# Patient Record
Sex: FEMALE | Race: WHITE | NOT HISPANIC OR LATINO | Employment: FULL TIME | URBAN - METROPOLITAN AREA
[De-identification: names, ages, dates, MRNs, and addresses within clinical notes are randomized per-mention and may not be internally consistent; named-entity substitution may affect disease eponyms.]

---

## 2019-02-28 ENCOUNTER — HOSPITAL ENCOUNTER (EMERGENCY)
Facility: HOSPITAL | Age: 24
Discharge: HOME | End: 2019-02-28
Attending: EMERGENCY MEDICINE

## 2019-02-28 VITALS
HEART RATE: 75 BPM | WEIGHT: 135 LBS | SYSTOLIC BLOOD PRESSURE: 109 MMHG | HEIGHT: 63 IN | DIASTOLIC BLOOD PRESSURE: 69 MMHG | RESPIRATION RATE: 18 BRPM | TEMPERATURE: 98 F | OXYGEN SATURATION: 97 % | BODY MASS INDEX: 23.92 KG/M2

## 2019-02-28 DIAGNOSIS — N73.0 PID (ACUTE PELVIC INFLAMMATORY DISEASE): Primary | ICD-10-CM

## 2019-02-28 LAB
ALBUMIN SERPL BCP-MCNC: 4.2 G/DL
ALP SERPL-CCNC: 81 U/L
ALT SERPL W/O P-5'-P-CCNC: 20 U/L
ANION GAP SERPL CALC-SCNC: 11 MMOL/L
AST SERPL-CCNC: 30 U/L
B-HCG UR QL: NEGATIVE
BACTERIA #/AREA URNS HPF: ABNORMAL /HPF
BASOPHILS # BLD AUTO: 0.04 K/UL
BASOPHILS NFR BLD: 0.4 %
BILIRUB DIRECT SERPL-MCNC: <0.1 MG/DL
BILIRUB SERPL-MCNC: 1 MG/DL
BILIRUB UR QL STRIP: NEGATIVE
BUN SERPL-MCNC: 7 MG/DL
CALCIUM SERPL-MCNC: 8.8 MG/DL
CHLORIDE SERPL-SCNC: 98 MMOL/L
CLARITY UR: ABNORMAL
CO2 SERPL-SCNC: 25 MMOL/L
COLOR UR: YELLOW
CREAT SERPL-MCNC: 0.7 MG/DL
DIFFERENTIAL METHOD: ABNORMAL
EOSINOPHIL # BLD AUTO: 0 K/UL
EOSINOPHIL NFR BLD: 0.2 %
ERYTHROCYTE [DISTWIDTH] IN BLOOD BY AUTOMATED COUNT: 11.2 %
EST. GFR  (AFRICAN AMERICAN): >60 ML/MIN/1.73 M^2
EST. GFR  (NON AFRICAN AMERICAN): >60 ML/MIN/1.73 M^2
GLUCOSE SERPL-MCNC: 88 MG/DL
GLUCOSE UR QL STRIP: NEGATIVE
HCT VFR BLD AUTO: 34.3 %
HGB BLD-MCNC: 11.8 G/DL
HGB UR QL STRIP: ABNORMAL
IMM GRANULOCYTES # BLD AUTO: 0.04 K/UL
IMM GRANULOCYTES NFR BLD AUTO: 0.4 %
KETONES UR QL STRIP: ABNORMAL
LEUKOCYTE ESTERASE UR QL STRIP: NEGATIVE
LYMPHOCYTES # BLD AUTO: 1.6 K/UL
LYMPHOCYTES NFR BLD: 17.9 %
MCH RBC QN AUTO: 33.5 PG
MCHC RBC AUTO-ENTMCNC: 34.4 G/DL
MCV RBC AUTO: 97 FL
MICROSCOPIC COMMENT: ABNORMAL
MONOCYTES # BLD AUTO: 1.1 K/UL
MONOCYTES NFR BLD: 11.8 %
NEUTROPHILS # BLD AUTO: 6.4 K/UL
NEUTROPHILS NFR BLD: 69.3 %
NITRITE UR QL STRIP: POSITIVE
NRBC BLD-RTO: 0 /100 WBC
PH UR STRIP: 7 [PH] (ref 5–8)
PLATELET # BLD AUTO: 246 K/UL
PMV BLD AUTO: 10.7 FL
POTASSIUM SERPL-SCNC: 3.6 MMOL/L
PROT SERPL-MCNC: 7.3 G/DL
PROT UR QL STRIP: NEGATIVE
RBC # BLD AUTO: 3.52 M/UL
RBC #/AREA URNS HPF: 5 /HPF (ref 0–4)
SODIUM SERPL-SCNC: 134 MMOL/L
SP GR UR STRIP: >=1.03 (ref 1–1.03)
SQUAMOUS #/AREA URNS HPF: 3 /HPF
URN SPEC COLLECT METH UR: ABNORMAL
UROBILINOGEN UR STRIP-ACNC: 1 EU/DL
WBC # BLD AUTO: 9.18 K/UL
WBC #/AREA URNS HPF: 5 /HPF (ref 0–5)

## 2019-02-28 PROCEDURE — 99284 EMERGENCY DEPT VISIT MOD MDM: CPT | Mod: 25

## 2019-02-28 PROCEDURE — 80076 HEPATIC FUNCTION PANEL: CPT

## 2019-02-28 PROCEDURE — 63600175 PHARM REV CODE 636 W HCPCS: Performed by: FAMILY MEDICINE

## 2019-02-28 PROCEDURE — 25500020 PHARM REV CODE 255: Performed by: EMERGENCY MEDICINE

## 2019-02-28 PROCEDURE — 85025 COMPLETE CBC W/AUTO DIFF WBC: CPT

## 2019-02-28 PROCEDURE — 81000 URINALYSIS NONAUTO W/SCOPE: CPT

## 2019-02-28 PROCEDURE — 74177 CT ABD & PELVIS W/CONTRAST: CPT | Mod: TC

## 2019-02-28 PROCEDURE — 80048 BASIC METABOLIC PNL TOTAL CA: CPT

## 2019-02-28 PROCEDURE — 81025 URINE PREGNANCY TEST: CPT

## 2019-02-28 PROCEDURE — 74177 CT ABDOMEN PELVIS WITH CONTRAST: ICD-10-PCS | Mod: 26,,, | Performed by: RADIOLOGY

## 2019-02-28 PROCEDURE — 25000003 PHARM REV CODE 250: Performed by: FAMILY MEDICINE

## 2019-02-28 PROCEDURE — 74177 CT ABD & PELVIS W/CONTRAST: CPT | Mod: 26,,, | Performed by: RADIOLOGY

## 2019-02-28 PROCEDURE — 96365 THER/PROPH/DIAG IV INF INIT: CPT

## 2019-02-28 RX ORDER — TRAZODONE HYDROCHLORIDE 100 MG/1
100 TABLET ORAL NIGHTLY
COMMUNITY
End: 2021-02-13 | Stop reason: CLARIF

## 2019-02-28 RX ORDER — TRAMADOL HYDROCHLORIDE 50 MG/1
100 TABLET ORAL
Status: COMPLETED | OUTPATIENT
Start: 2019-02-28 | End: 2019-02-28

## 2019-02-28 RX ORDER — TRAMADOL HYDROCHLORIDE 50 MG/1
100 TABLET ORAL EVERY 8 HOURS PRN
Qty: 12 TABLET | Refills: 0 | Status: SHIPPED | OUTPATIENT
Start: 2019-02-28 | End: 2021-02-13 | Stop reason: CLARIF

## 2019-02-28 RX ORDER — DOXYCYCLINE HYCLATE 100 MG
100 TABLET ORAL
Status: COMPLETED | OUTPATIENT
Start: 2019-02-28 | End: 2019-02-28

## 2019-02-28 RX ORDER — IBUPROFEN 400 MG/1
800 TABLET ORAL
Status: COMPLETED | OUTPATIENT
Start: 2019-02-28 | End: 2019-02-28

## 2019-02-28 RX ORDER — DOXYCYCLINE 100 MG/1
100 CAPSULE ORAL 2 TIMES DAILY
Qty: 20 CAPSULE | Refills: 0 | Status: SHIPPED | OUTPATIENT
Start: 2019-02-28 | End: 2019-03-10

## 2019-02-28 RX ADMIN — IOHEXOL 75 ML: 350 INJECTION, SOLUTION INTRAVENOUS at 06:02

## 2019-02-28 RX ADMIN — DOXYCYCLINE HYCLATE 100 MG: 100 TABLET, COATED ORAL at 08:02

## 2019-02-28 RX ADMIN — IBUPROFEN 800 MG: 400 TABLET ORAL at 06:02

## 2019-02-28 RX ADMIN — CEFTRIAXONE 500 MG: 1 INJECTION, SOLUTION INTRAVENOUS at 08:02

## 2019-02-28 RX ADMIN — TRAMADOL HYDROCHLORIDE 100 MG: 50 TABLET, COATED ORAL at 08:02

## 2019-02-28 NOTE — ED PROVIDER NOTES
Encounter Date: 2/28/2019       History     Chief Complaint   Patient presents with    Abdominal Pain     Patient presents with complaints of abdominal pain. Patient started having symptoms 2 days ago.  Pain started sometime after having intercourse with her boyfriend.  Patient describes suprapubic pain that is crampy, constant, nonradiating, with no exacerbating or alleviating factors.  When she woke up today she was having pain in her both upper quadrants.  She denies any nausea or vomiting.  She has about 3 weeks out from her last menstrual cycle.  She denies fever.  She has had cloudy urine but denies any dysuria.  The patient notes pain when she takes a deep breath but she denies any shortness of breath or cough.          Review of patient's allergies indicates:  No Known Allergies  History reviewed. No pertinent past medical history.  History reviewed. No pertinent surgical history.  History reviewed. No pertinent family history.  Social History     Tobacco Use    Smoking status: Never Smoker    Smokeless tobacco: Never Used   Substance Use Topics    Alcohol use: No     Frequency: Never    Drug use: No     Review of Systems   Constitutional: Negative for fever.   HENT: Negative for sore throat.    Respiratory: Negative for chest tightness and shortness of breath.    Cardiovascular: Negative for palpitations.   Gastrointestinal: Positive for abdominal pain. Negative for diarrhea, nausea and vomiting.   Genitourinary: Positive for pelvic pain. Negative for dysuria.   All other systems reviewed and are negative.      Physical Exam     Initial Vitals [02/28/19 1605]   BP Pulse Resp Temp SpO2   102/68 (!) 125 20 -- 96 %      MAP       --         Physical Exam    Nursing note and vitals reviewed.  Constitutional: She appears well-developed and well-nourished. She is not diaphoretic. No distress.   HENT:   Head: Normocephalic and atraumatic.   Right Ear: External ear normal.   Left Ear: External ear normal.    Nose: Nose normal.   Mouth/Throat: Oropharynx is clear and moist.   Eyes: Conjunctivae and EOM are normal. Pupils are equal, round, and reactive to light.   Neck: Normal range of motion. Neck supple.   Cardiovascular: Normal rate, regular rhythm and normal heart sounds.   Pulmonary/Chest: Breath sounds normal.   Abdominal: Soft. She exhibits no mass. There is no guarding.   Patient has mild tenderness in the upper quadrants and significant tenderness in the suprapubic region.  No CVA tenderness noted.   Musculoskeletal: Normal range of motion. She exhibits no edema or tenderness.   Neurological: She is alert and oriented to person, place, and time. No cranial nerve deficit.   Skin: Skin is warm and dry.   Psychiatric: She has a normal mood and affect. Thought content normal.         ED Course   Procedures  Labs Reviewed   URINALYSIS, REFLEX TO URINE CULTURE - Abnormal; Notable for the following components:       Result Value    Appearance, UA Hazy (*)     Specific Gravity, UA >=1.030 (*)     Ketones, UA 1+ (*)     Occult Blood UA 2+ (*)     Nitrite, UA Positive (*)     All other components within normal limits    Narrative:     Preferred Collection Type->Urine, Clean Catch   URINALYSIS MICROSCOPIC - Abnormal; Notable for the following components:    RBC, UA 5 (*)     Bacteria, UA Many (*)     All other components within normal limits    Narrative:     Preferred Collection Type->Urine, Clean Catch   PREGNANCY TEST, URINE RAPID   CBC W/ AUTO DIFFERENTIAL   BASIC METABOLIC PANEL          Imaging Results    None                               Clinical Impression:       ICD-10-CM ICD-9-CM   1. PID (acute pelvic inflammatory disease) N73.0 614.3                                James Angel MD  03/07/19 0575

## 2019-02-28 NOTE — ED TRIAGE NOTES
Patient presents to ER reporting severe abdominal pain after sex. Fist occurring x 2 days ago,then again last hs. Patient feels as though her abd is bloated. She relays that she is having severe pain when she breaths in.

## 2019-03-01 NOTE — ED NOTES
Pt resting comfortably. Reports pain is worse with movement. Visitor at bedside. Updated on plan of care. Waiting on CT report. Denies needs.

## 2019-03-01 NOTE — ED NOTES
No acute changes. Rates pain 5/10. Updated on plan of care and lab add on. Denies needs. Will continue to monitor.

## 2021-02-13 ENCOUNTER — HOSPITAL ENCOUNTER (EMERGENCY)
Facility: HOSPITAL | Age: 26
Discharge: HOME | End: 2021-02-13
Attending: INTERNAL MEDICINE

## 2021-02-13 VITALS
OXYGEN SATURATION: 97 % | WEIGHT: 125 LBS | TEMPERATURE: 99 F | HEART RATE: 87 BPM | SYSTOLIC BLOOD PRESSURE: 119 MMHG | DIASTOLIC BLOOD PRESSURE: 76 MMHG | RESPIRATION RATE: 15 BRPM | BODY MASS INDEX: 22.15 KG/M2 | HEIGHT: 63 IN

## 2021-02-13 DIAGNOSIS — N39.0 URINARY TRACT INFECTION WITH HEMATURIA, SITE UNSPECIFIED: ICD-10-CM

## 2021-02-13 DIAGNOSIS — R10.9 ABDOMINAL PAIN, UNSPECIFIED ABDOMINAL LOCATION: Primary | ICD-10-CM

## 2021-02-13 DIAGNOSIS — R31.9 URINARY TRACT INFECTION WITH HEMATURIA, SITE UNSPECIFIED: ICD-10-CM

## 2021-02-13 LAB
ALBUMIN SERPL BCP-MCNC: 5.3 G/DL (ref 3.5–5.2)
ALP SERPL-CCNC: 105 U/L (ref 55–135)
ALT SERPL W/O P-5'-P-CCNC: 98 U/L (ref 10–44)
AMPHET+METHAMPHET UR QL: NEGATIVE
ANION GAP SERPL CALC-SCNC: 14 MMOL/L (ref 8–16)
AST SERPL-CCNC: 157 U/L (ref 10–40)
B-HCG UR QL: NEGATIVE
BACTERIA #/AREA URNS HPF: ABNORMAL /HPF
BARBITURATES UR QL SCN>200 NG/ML: NEGATIVE
BASOPHILS # BLD AUTO: 0.03 K/UL (ref 0–0.2)
BASOPHILS NFR BLD: 0.5 % (ref 0–1.9)
BENZODIAZ UR QL SCN>200 NG/ML: NEGATIVE
BILIRUB SERPL-MCNC: 2.1 MG/DL (ref 0.1–1)
BILIRUB UR QL STRIP: ABNORMAL
BUN SERPL-MCNC: 7 MG/DL (ref 6–20)
BZE UR QL SCN: NEGATIVE
CALCIUM SERPL-MCNC: 10.1 MG/DL (ref 8.7–10.5)
CANNABINOIDS UR QL SCN: ABNORMAL
CHLORIDE SERPL-SCNC: 96 MMOL/L (ref 95–110)
CLARITY UR: ABNORMAL
CO2 SERPL-SCNC: 26 MMOL/L (ref 23–29)
COLOR UR: ABNORMAL
CREAT SERPL-MCNC: 0.7 MG/DL (ref 0.5–1.4)
CREAT UR-MCNC: >400 MG/DL (ref 15–325)
DIFFERENTIAL METHOD: ABNORMAL
EOSINOPHIL # BLD AUTO: 0 K/UL (ref 0–0.5)
EOSINOPHIL NFR BLD: 0.2 % (ref 0–8)
ERYTHROCYTE [DISTWIDTH] IN BLOOD BY AUTOMATED COUNT: 11.2 % (ref 11.5–14.5)
EST. GFR  (AFRICAN AMERICAN): >60 ML/MIN/1.73 M^2
EST. GFR  (NON AFRICAN AMERICAN): >60 ML/MIN/1.73 M^2
GLUCOSE SERPL-MCNC: 102 MG/DL (ref 70–110)
GLUCOSE UR QL STRIP: NEGATIVE
HCT VFR BLD AUTO: 43.4 % (ref 37–48.5)
HGB BLD-MCNC: 15.2 G/DL (ref 12–16)
HGB UR QL STRIP: ABNORMAL
HYALINE CASTS #/AREA URNS LPF: ABNORMAL /LPF
IMM GRANULOCYTES # BLD AUTO: 0.01 K/UL (ref 0–0.04)
IMM GRANULOCYTES NFR BLD AUTO: 0.2 % (ref 0–0.5)
KETONES UR QL STRIP: ABNORMAL
LEUKOCYTE ESTERASE UR QL STRIP: ABNORMAL
LIPASE SERPL-CCNC: 20 U/L (ref 4–60)
LYMPHOCYTES # BLD AUTO: 1 K/UL (ref 1–4.8)
LYMPHOCYTES NFR BLD: 17.4 % (ref 18–48)
MAGNESIUM SERPL-MCNC: 1.6 MG/DL (ref 1.6–2.6)
MCH RBC QN AUTO: 34.5 PG (ref 27–31)
MCHC RBC AUTO-ENTMCNC: 35 G/DL (ref 32–36)
MCV RBC AUTO: 99 FL (ref 82–98)
MICROSCOPIC COMMENT: ABNORMAL
MONOCYTES # BLD AUTO: 0.6 K/UL (ref 0.3–1)
MONOCYTES NFR BLD: 9.5 % (ref 4–15)
NEUTROPHILS # BLD AUTO: 4.3 K/UL (ref 1.8–7.7)
NEUTROPHILS NFR BLD: 72.2 % (ref 38–73)
NITRITE UR QL STRIP: POSITIVE
NRBC BLD-RTO: 0 /100 WBC
OPIATES UR QL SCN: NEGATIVE
PCP UR QL SCN>25 NG/ML: NEGATIVE
PH UR STRIP: 7 [PH] (ref 5–8)
PLATELET # BLD AUTO: 238 K/UL (ref 150–350)
PMV BLD AUTO: 11.2 FL (ref 9.2–12.9)
POTASSIUM SERPL-SCNC: 4 MMOL/L (ref 3.5–5.1)
PROT SERPL-MCNC: 8.9 G/DL (ref 6–8.4)
PROT UR QL STRIP: ABNORMAL
RBC # BLD AUTO: 4.4 M/UL (ref 4–5.4)
RBC #/AREA URNS HPF: 20 /HPF (ref 0–4)
SODIUM SERPL-SCNC: 136 MMOL/L (ref 136–145)
SP GR UR STRIP: 1.02 (ref 1–1.03)
SQUAMOUS #/AREA URNS HPF: ABNORMAL /HPF
TOXICOLOGY INFORMATION: ABNORMAL
TSH SERPL DL<=0.005 MIU/L-ACNC: 1.57 UIU/ML (ref 0.34–5.6)
URN SPEC COLLECT METH UR: ABNORMAL
UROBILINOGEN UR STRIP-ACNC: ABNORMAL EU/DL
WBC # BLD AUTO: 5.91 K/UL (ref 3.9–12.7)
WBC #/AREA URNS HPF: 20 /HPF (ref 0–5)

## 2021-02-13 PROCEDURE — 87088 URINE BACTERIA CULTURE: CPT

## 2021-02-13 PROCEDURE — 87591 N.GONORRHOEAE DNA AMP PROB: CPT

## 2021-02-13 PROCEDURE — 83690 ASSAY OF LIPASE: CPT

## 2021-02-13 PROCEDURE — 74176 CT ABD & PELVIS W/O CONTRAST: CPT | Mod: 26,,, | Performed by: RADIOLOGY

## 2021-02-13 PROCEDURE — 85025 COMPLETE CBC W/AUTO DIFF WBC: CPT

## 2021-02-13 PROCEDURE — 96361 HYDRATE IV INFUSION ADD-ON: CPT

## 2021-02-13 PROCEDURE — 87186 SC STD MICRODIL/AGAR DIL: CPT

## 2021-02-13 PROCEDURE — 96365 THER/PROPH/DIAG IV INF INIT: CPT

## 2021-02-13 PROCEDURE — 84443 ASSAY THYROID STIM HORMONE: CPT

## 2021-02-13 PROCEDURE — 87077 CULTURE AEROBIC IDENTIFY: CPT

## 2021-02-13 PROCEDURE — 74176 CT ABD & PELVIS W/O CONTRAST: CPT | Mod: TC

## 2021-02-13 PROCEDURE — 87491 CHLMYD TRACH DNA AMP PROBE: CPT

## 2021-02-13 PROCEDURE — 25000003 PHARM REV CODE 250: Performed by: NURSE PRACTITIONER

## 2021-02-13 PROCEDURE — 80307 DRUG TEST PRSMV CHEM ANLYZR: CPT

## 2021-02-13 PROCEDURE — 87086 URINE CULTURE/COLONY COUNT: CPT

## 2021-02-13 PROCEDURE — 63600175 PHARM REV CODE 636 W HCPCS: Performed by: FAMILY MEDICINE

## 2021-02-13 PROCEDURE — 25000003 PHARM REV CODE 250: Performed by: FAMILY MEDICINE

## 2021-02-13 PROCEDURE — 81025 URINE PREGNANCY TEST: CPT

## 2021-02-13 PROCEDURE — 81000 URINALYSIS NONAUTO W/SCOPE: CPT | Mod: 59

## 2021-02-13 PROCEDURE — 83735 ASSAY OF MAGNESIUM: CPT

## 2021-02-13 PROCEDURE — 74176 CT RENAL STONE STUDY ABD PELVIS WO: ICD-10-PCS | Mod: 26,,, | Performed by: RADIOLOGY

## 2021-02-13 PROCEDURE — 80053 COMPREHEN METABOLIC PANEL: CPT

## 2021-02-13 PROCEDURE — 99284 EMERGENCY DEPT VISIT MOD MDM: CPT | Mod: 25

## 2021-02-13 PROCEDURE — 96375 TX/PRO/DX INJ NEW DRUG ADDON: CPT

## 2021-02-13 RX ORDER — ORPHENADRINE CITRATE 100 MG/1
100 TABLET, EXTENDED RELEASE ORAL 2 TIMES DAILY PRN
Qty: 20 TABLET | Refills: 0 | Status: SHIPPED | OUTPATIENT
Start: 2021-02-13 | End: 2021-02-23

## 2021-02-13 RX ORDER — KETOROLAC TROMETHAMINE 10 MG/1
10 TABLET, FILM COATED ORAL EVERY 6 HOURS PRN
Qty: 30 TABLET | Refills: 0 | Status: SHIPPED | OUTPATIENT
Start: 2021-02-13 | End: 2021-07-11 | Stop reason: CLARIF

## 2021-02-13 RX ORDER — KETOROLAC TROMETHAMINE 30 MG/ML
30 INJECTION, SOLUTION INTRAMUSCULAR; INTRAVENOUS
Status: COMPLETED | OUTPATIENT
Start: 2021-02-13 | End: 2021-02-13

## 2021-02-13 RX ORDER — NITROFURANTOIN 25; 75 MG/1; MG/1
100 CAPSULE ORAL 2 TIMES DAILY
Qty: 14 CAPSULE | Refills: 0 | Status: SHIPPED | OUTPATIENT
Start: 2021-02-13 | End: 2021-02-20

## 2021-02-13 RX ADMIN — SODIUM CHLORIDE 1000 ML: 0.9 INJECTION, SOLUTION INTRAVENOUS at 06:02

## 2021-02-13 RX ADMIN — CEFTRIAXONE SODIUM 2 G: 2 INJECTION, POWDER, FOR SOLUTION INTRAMUSCULAR; INTRAVENOUS at 07:02

## 2021-02-13 RX ADMIN — KETOROLAC TROMETHAMINE 30 MG: 30 INJECTION, SOLUTION INTRAMUSCULAR; INTRAVENOUS at 06:02

## 2021-02-15 LAB
C TRACH DNA SPEC QL NAA+PROBE: NOT DETECTED
N GONORRHOEA DNA SPEC QL NAA+PROBE: NOT DETECTED

## 2021-02-16 LAB — BACTERIA UR CULT: ABNORMAL

## 2021-07-11 ENCOUNTER — HOSPITAL ENCOUNTER (EMERGENCY)
Facility: HOSPITAL | Age: 26
Discharge: HOME | End: 2021-07-12
Attending: FAMILY MEDICINE

## 2021-07-11 VITALS
BODY MASS INDEX: 21.26 KG/M2 | DIASTOLIC BLOOD PRESSURE: 94 MMHG | TEMPERATURE: 98 F | SYSTOLIC BLOOD PRESSURE: 126 MMHG | HEIGHT: 63 IN | HEART RATE: 92 BPM | OXYGEN SATURATION: 97 % | WEIGHT: 120 LBS | RESPIRATION RATE: 20 BRPM

## 2021-07-11 DIAGNOSIS — T14.90XA TRAUMA: ICD-10-CM

## 2021-07-11 DIAGNOSIS — S60.222A CONTUSION OF LEFT HAND, INITIAL ENCOUNTER: Primary | ICD-10-CM

## 2021-07-11 DIAGNOSIS — R52 PAIN: ICD-10-CM

## 2021-07-11 PROCEDURE — 73110 X-RAY EXAM OF WRIST: CPT | Mod: 26,LT,, | Performed by: RADIOLOGY

## 2021-07-11 PROCEDURE — 99284 EMERGENCY DEPT VISIT MOD MDM: CPT | Mod: 25

## 2021-07-11 PROCEDURE — 73120 X-RAY EXAM OF HAND: CPT | Mod: TC,FY,LT

## 2021-07-11 PROCEDURE — 73110 X-RAY EXAM OF WRIST: CPT | Mod: TC,FY,LT

## 2021-07-11 PROCEDURE — 73120 XR HAND 2 VIEW LEFT: ICD-10-PCS | Mod: 26,LT,, | Performed by: RADIOLOGY

## 2021-07-11 PROCEDURE — 73110 XR WRIST COMPLETE 3 VIEWS LEFT: ICD-10-PCS | Mod: 26,LT,, | Performed by: RADIOLOGY

## 2021-07-11 PROCEDURE — 73120 X-RAY EXAM OF HAND: CPT | Mod: 26,LT,, | Performed by: RADIOLOGY

## 2022-01-17 ENCOUNTER — HOSPITAL ENCOUNTER (EMERGENCY)
Facility: HOSPITAL | Age: 27
Discharge: HOME | End: 2022-01-17
Attending: INTERNAL MEDICINE

## 2022-01-17 VITALS
SYSTOLIC BLOOD PRESSURE: 102 MMHG | HEIGHT: 63 IN | TEMPERATURE: 99 F | BODY MASS INDEX: 21.26 KG/M2 | RESPIRATION RATE: 20 BRPM | WEIGHT: 120 LBS | DIASTOLIC BLOOD PRESSURE: 56 MMHG | OXYGEN SATURATION: 99 % | HEART RATE: 110 BPM

## 2022-01-17 DIAGNOSIS — N20.0 KIDNEY STONE: Primary | ICD-10-CM

## 2022-01-17 LAB
ALBUMIN SERPL BCP-MCNC: 4.5 G/DL (ref 3.5–5.2)
ALP SERPL-CCNC: 126 U/L (ref 55–135)
ALT SERPL W/O P-5'-P-CCNC: 105 U/L (ref 10–44)
ANION GAP SERPL CALC-SCNC: 19 MMOL/L (ref 8–16)
AST SERPL-CCNC: 168 U/L (ref 10–40)
B-HCG UR QL: NEGATIVE
BACTERIA #/AREA URNS HPF: ABNORMAL /HPF
BASOPHILS # BLD AUTO: 0.05 K/UL (ref 0–0.2)
BASOPHILS NFR BLD: 1 % (ref 0–1.9)
BILIRUB SERPL-MCNC: 1.2 MG/DL (ref 0.1–1)
BILIRUB UR QL STRIP: ABNORMAL
BUN SERPL-MCNC: 6 MG/DL (ref 6–20)
CALCIUM SERPL-MCNC: 9.8 MG/DL (ref 8.7–10.5)
CHLORIDE SERPL-SCNC: 94 MMOL/L (ref 95–110)
CLARITY UR: ABNORMAL
CO2 SERPL-SCNC: 21 MMOL/L (ref 23–29)
COLOR UR: YELLOW
CREAT SERPL-MCNC: 0.8 MG/DL (ref 0.5–1.4)
DIFFERENTIAL METHOD: ABNORMAL
EOSINOPHIL # BLD AUTO: 0 K/UL (ref 0–0.5)
EOSINOPHIL NFR BLD: 0.4 % (ref 0–8)
ERYTHROCYTE [DISTWIDTH] IN BLOOD BY AUTOMATED COUNT: 11.4 % (ref 11.5–14.5)
EST. GFR  (AFRICAN AMERICAN): >60 ML/MIN/1.73 M^2
EST. GFR  (NON AFRICAN AMERICAN): >60 ML/MIN/1.73 M^2
GLUCOSE SERPL-MCNC: 91 MG/DL (ref 70–110)
GLUCOSE UR QL STRIP: ABNORMAL
HCT VFR BLD AUTO: 41.5 % (ref 37–48.5)
HGB BLD-MCNC: 14.8 G/DL (ref 12–16)
HGB UR QL STRIP: ABNORMAL
HYALINE CASTS #/AREA URNS LPF: 0 /LPF
IMM GRANULOCYTES # BLD AUTO: 0.01 K/UL (ref 0–0.04)
IMM GRANULOCYTES NFR BLD AUTO: 0.2 % (ref 0–0.5)
KETONES UR QL STRIP: ABNORMAL
LEUKOCYTE ESTERASE UR QL STRIP: NEGATIVE
LYMPHOCYTES # BLD AUTO: 0.2 K/UL (ref 1–4.8)
LYMPHOCYTES NFR BLD: 4.6 % (ref 18–48)
MCH RBC QN AUTO: 35.2 PG (ref 27–31)
MCHC RBC AUTO-ENTMCNC: 35.7 G/DL (ref 32–36)
MCV RBC AUTO: 99 FL (ref 82–98)
MICROSCOPIC COMMENT: ABNORMAL
MONOCYTES # BLD AUTO: 0.7 K/UL (ref 0.3–1)
MONOCYTES NFR BLD: 13.1 % (ref 4–15)
NEUTROPHILS # BLD AUTO: 4.2 K/UL (ref 1.8–7.7)
NEUTROPHILS NFR BLD: 80.7 % (ref 38–73)
NITRITE UR QL STRIP: NEGATIVE
NRBC BLD-RTO: 0 /100 WBC
PH UR STRIP: 6 [PH] (ref 5–8)
PLATELET # BLD AUTO: 197 K/UL (ref 150–450)
PMV BLD AUTO: 10.6 FL (ref 9.2–12.9)
POTASSIUM SERPL-SCNC: 3.8 MMOL/L (ref 3.5–5.1)
PROT SERPL-MCNC: 7.9 G/DL (ref 6–8.4)
PROT UR QL STRIP: ABNORMAL
RBC # BLD AUTO: 4.2 M/UL (ref 4–5.4)
RBC #/AREA URNS HPF: 2 /HPF (ref 0–4)
SODIUM SERPL-SCNC: 134 MMOL/L (ref 136–145)
SP GR UR STRIP: >=1.03 (ref 1–1.03)
URN SPEC COLLECT METH UR: ABNORMAL
UROBILINOGEN UR STRIP-ACNC: 1 EU/DL
WBC # BLD AUTO: 5.2 K/UL (ref 3.9–12.7)
WBC #/AREA URNS HPF: 3 /HPF (ref 0–5)

## 2022-01-17 PROCEDURE — 96361 HYDRATE IV INFUSION ADD-ON: CPT

## 2022-01-17 PROCEDURE — 74176 CT ABD & PELVIS W/O CONTRAST: CPT | Mod: TC

## 2022-01-17 PROCEDURE — 99284 EMERGENCY DEPT VISIT MOD MDM: CPT | Mod: 25

## 2022-01-17 PROCEDURE — U0003 INFECTIOUS AGENT DETECTION BY NUCLEIC ACID (DNA OR RNA); SEVERE ACUTE RESPIRATORY SYNDROME CORONAVIRUS 2 (SARS-COV-2) (CORONAVIRUS DISEASE [COVID-19]), AMPLIFIED PROBE TECHNIQUE, MAKING USE OF HIGH THROUGHPUT TECHNOLOGIES AS DESCRIBED BY CMS-2020-01-R: HCPCS | Performed by: NURSE PRACTITIONER

## 2022-01-17 PROCEDURE — 80053 COMPREHEN METABOLIC PANEL: CPT | Performed by: NURSE PRACTITIONER

## 2022-01-17 PROCEDURE — 25000003 PHARM REV CODE 250: Performed by: NURSE PRACTITIONER

## 2022-01-17 PROCEDURE — 63600175 PHARM REV CODE 636 W HCPCS

## 2022-01-17 PROCEDURE — 96365 THER/PROPH/DIAG IV INF INIT: CPT

## 2022-01-17 PROCEDURE — 96375 TX/PRO/DX INJ NEW DRUG ADDON: CPT

## 2022-01-17 PROCEDURE — 81025 URINE PREGNANCY TEST: CPT | Performed by: EMERGENCY MEDICINE

## 2022-01-17 PROCEDURE — 63600175 PHARM REV CODE 636 W HCPCS: Performed by: NURSE PRACTITIONER

## 2022-01-17 PROCEDURE — 74176 CT ABD & PELVIS W/O CONTRAST: CPT | Mod: 26,,, | Performed by: RADIOLOGY

## 2022-01-17 PROCEDURE — U0005 INFEC AGEN DETEC AMPLI PROBE: HCPCS | Performed by: NURSE PRACTITIONER

## 2022-01-17 PROCEDURE — 85025 COMPLETE CBC W/AUTO DIFF WBC: CPT | Performed by: NURSE PRACTITIONER

## 2022-01-17 PROCEDURE — 81000 URINALYSIS NONAUTO W/SCOPE: CPT | Performed by: NURSE PRACTITIONER

## 2022-01-17 PROCEDURE — 74176 CT RENAL STONE STUDY ABD PELVIS WO: ICD-10-PCS | Mod: 26,,, | Performed by: RADIOLOGY

## 2022-01-17 RX ORDER — ACETAMINOPHEN 325 MG/1
650 TABLET ORAL
Status: COMPLETED | OUTPATIENT
Start: 2022-01-17 | End: 2022-01-17

## 2022-01-17 RX ORDER — SODIUM CHLORIDE 9 MG/ML
INJECTION, SOLUTION INTRAVENOUS
Status: COMPLETED | OUTPATIENT
Start: 2022-01-17 | End: 2022-01-17

## 2022-01-17 RX ORDER — TRAMADOL HYDROCHLORIDE 50 MG/1
50 TABLET ORAL EVERY 6 HOURS PRN
Qty: 6 TABLET | Refills: 0 | OUTPATIENT
Start: 2022-01-17 | End: 2023-04-13

## 2022-01-17 RX ORDER — ONDANSETRON 4 MG/1
4 TABLET, FILM COATED ORAL EVERY 6 HOURS
Qty: 12 TABLET | Refills: 0 | Status: SHIPPED | OUTPATIENT
Start: 2022-01-17 | End: 2023-10-22 | Stop reason: CLARIF

## 2022-01-17 RX ORDER — TAMSULOSIN HYDROCHLORIDE 0.4 MG/1
0.4 CAPSULE ORAL DAILY
Qty: 6 CAPSULE | Refills: 0 | Status: SHIPPED | OUTPATIENT
Start: 2022-01-17 | End: 2022-01-23

## 2022-01-17 RX ORDER — KETOROLAC TROMETHAMINE 30 MG/ML
15 INJECTION, SOLUTION INTRAMUSCULAR; INTRAVENOUS
Status: COMPLETED | OUTPATIENT
Start: 2022-01-17 | End: 2022-01-17

## 2022-01-17 RX ORDER — KETOROLAC TROMETHAMINE 30 MG/ML
INJECTION, SOLUTION INTRAMUSCULAR; INTRAVENOUS
Status: COMPLETED
Start: 2022-01-17 | End: 2022-01-17

## 2022-01-17 RX ORDER — ONDANSETRON 2 MG/ML
4 INJECTION INTRAMUSCULAR; INTRAVENOUS
Status: COMPLETED | OUTPATIENT
Start: 2022-01-17 | End: 2022-01-17

## 2022-01-17 RX ADMIN — SODIUM CHLORIDE 1000 ML/HR: 0.9 INJECTION, SOLUTION INTRAVENOUS at 08:01

## 2022-01-17 RX ADMIN — ACETAMINOPHEN 650 MG: 325 TABLET ORAL at 08:01

## 2022-01-17 RX ADMIN — CEFTRIAXONE 1 G: 1 INJECTION, SOLUTION INTRAVENOUS at 09:01

## 2022-01-17 RX ADMIN — ONDANSETRON 4 MG: 2 INJECTION INTRAMUSCULAR; INTRAVENOUS at 08:01

## 2022-01-17 RX ADMIN — KETOROLAC TROMETHAMINE 15 MG: 30 INJECTION, SOLUTION INTRAMUSCULAR; INTRAVENOUS at 09:01

## 2022-01-17 RX ADMIN — KETOROLAC TROMETHAMINE 15 MG: 30 INJECTION, SOLUTION INTRAMUSCULAR at 09:01

## 2022-01-17 NOTE — Clinical Note
"Sarah"Donna Montalvo was seen and treated in our emergency department on 1/17/2022.     COVID-19 is present in our communities across the state. There is limited testing for COVID at this time, so not all patients can be tested. In this situation, your employee meets the following criteria:    Sarah Montalvo has met the criteria for COVID-19 testing and has a POSITIVE result. She can return to work once they are asymptomatic for 24 hours without the use of fever reducing medications AND at least five days from the first positive result. A mask is recommended for 5 days post quarantine.     If you have any questions or concerns, or if I can be of further assistance, please do not hesitate to contact me.    Sincerely,             SALO Connelly"

## 2022-01-18 LAB
SARS-COV-2 RNA RESP QL NAA+PROBE: DETECTED
SARS-COV-2- CYCLE NUMBER: 24

## 2022-01-18 NOTE — ED PROVIDER NOTES
Encounter Date: 1/17/2022       History     Chief Complaint   Patient presents with    Nausea    COVID-19 Concerns    Generalized Body Aches    Dysuria     Patient presents complaining of generalized body aches nausea as well as concern for urinary tract infection with mild dysuria states she is concerned she has COVID.        Review of patient's allergies indicates:  No Known Allergies  History reviewed. No pertinent past medical history.  History reviewed. No pertinent surgical history.  History reviewed. No pertinent family history.  Social History     Tobacco Use    Smoking status: Never Smoker    Smokeless tobacco: Never Used   Substance Use Topics    Alcohol use: No    Drug use: No     Review of Systems   Constitutional: Negative.    HENT: Positive for congestion.    Eyes: Negative.    Respiratory: Positive for cough.    Gastrointestinal: Positive for nausea.   Genitourinary: Positive for decreased urine volume and urgency. Negative for flank pain.   Neurological: Negative.    Psychiatric/Behavioral: Negative.        Physical Exam     Initial Vitals   BP Pulse Resp Temp SpO2   01/17/22 1841 01/17/22 1838 01/17/22 1838 01/17/22 1838 01/17/22 1841   104/81 110 20 98.7 °F (37.1 °C) 99 %      MAP       --                Physical Exam    Vitals reviewed.  Constitutional: She appears well-developed.   HENT:   Head: Normocephalic.   Eyes: Pupils are equal, round, and reactive to light.   Neck:   Normal range of motion.  Cardiovascular: Normal rate and regular rhythm.   Pulmonary/Chest: Breath sounds normal. She has no wheezes. She has no rhonchi. She has no rales.   Musculoskeletal:         General: Normal range of motion.      Cervical back: Normal range of motion.     Neurological: She is alert and oriented to person, place, and time. GCS score is 15. GCS eye subscore is 4. GCS verbal subscore is 5. GCS motor subscore is 6.   Skin: Skin is warm. Capillary refill takes less than 2 seconds.         ED Course    Procedures  Labs Reviewed   SARS-COV-2 (COVID-19) QUALITATIVE PCR - Abnormal; Notable for the following components:       Result Value    SARS-CoV2 (COVID-19) Qualitative PCR Detected (*)     All other components within normal limits    Narrative:     Is the patient symptomatic?->Yes  Is testing needed for patient travel?->No  Is this needed for pre-procedure or pre-op testing?->No   URINALYSIS, REFLEX TO URINE CULTURE - Abnormal; Notable for the following components:    Appearance, UA Hazy (*)     Specific Gravity, UA >=1.030 (*)     Protein, UA 2+ (*)     Glucose, UA Trace (*)     Ketones, UA 3+ (*)     Bilirubin (UA) 3+ (*)     Occult Blood UA 2+ (*)     All other components within normal limits    Narrative:     Preferred Collection Type->Urine, Clean Catch  Specimen Source->Urine   URINALYSIS MICROSCOPIC - Abnormal; Notable for the following components:    Bacteria Few (*)     All other components within normal limits    Narrative:     Preferred Collection Type->Urine, Clean Catch  Specimen Source->Urine   CBC W/ AUTO DIFFERENTIAL - Abnormal; Notable for the following components:    MCV 99 (*)     MCH 35.2 (*)     RDW 11.4 (*)     Lymph # 0.2 (*)     Gran % 80.7 (*)     Lymph % 4.6 (*)     All other components within normal limits   COMPREHENSIVE METABOLIC PANEL - Abnormal; Notable for the following components:    Sodium 134 (*)     Chloride 94 (*)     CO2 21 (*)     Total Bilirubin 1.2 (*)      (*)      (*)     Anion Gap 19 (*)     All other components within normal limits   PREGNANCY TEST, URINE RAPID    Narrative:     Specimen Source->Urine   SARS-COV-2- CYCLE NUMBER    Narrative:     Is the patient symptomatic?->Yes  Is testing needed for patient travel?->No  Is this needed for pre-procedure or pre-op testing?->No          Imaging Results          CT Renal Stone Study ABD Pelvis WO (Final result)  Result time 01/18/22 08:02:56    Final result by Debbie Atkinson MD (01/18/22 08:02:56)                  Impression:      8 mm nonobstructing left renal stone.  No hydronephrosis, opaque ureteral stone, ureteral obstruction or other acute findings evident    Final read      Electronically signed by: Debbie Atkinson MD  Date:    01/18/2022  Time:    08:02             Narrative:    EXAMINATION:  CT RENAL STONE STUDY ABD PELVIS WO    CLINICAL HISTORY:  Flank pain, kidney stone suspected;    TECHNIQUE:  Low dose axial images, sagittal and coronal reformations were obtained from the lung bases to the pubic symphysis.  Contrast was not administered.    COMPARISON:  02/13/2021    FINDINGS:  Severe diffuse fatty infiltration of the liver.  No calcified stones the gallbladder or CT findings of acute cholecystitis.  No biliary duct dilatation.  Spleen not enlarged.  Adrenal glands unremarkable appearance.  Pancreas unremarkable appearance.  Abdominal aorta no aneurysm    Stomach, bowel, mesentery; normal appearance of the appendix.  No appreciable bowel wall thickening or inflammatory change.  No free intraperitoneal air or fluid.    Kidneys, ureters, bladder;    Unremarkable appearance of the right kidney.  No hydronephrosis, opaque renal or ureteral stone or ureteral obstruction    Left kidney; 8 mm lower pole stone.  No hydronephrosis.  No ureteral dilatation or opaque ureteral stone or ureteral obstruction    Urinary bladder decompressed at the time of the exam.    Reproductive organs; unremarkable appearance for the patient's age.  Bilateral ovarian cysts.  Typically better evaluate by ultrasound which could be obtained if indicated clinically.    Osseous structures mild degenerative changes no obvious aggressive appearing osseous lesion                                 Medications   acetaminophen tablet 650 mg (650 mg Oral Given 1/17/22 2051)   ondansetron injection 4 mg (4 mg Intravenous Given 1/17/22 2051)   0.9%  NaCl infusion ( Intravenous Stopped 1/17/22 2240)   cefTRIAXone (ROCEPHIN) 1 g/50 mL D5W IVPB (0 g  Intravenous Stopped 22)   ketorolac injection 15 mg (15 mg Intravenous Given 22)     Medical Decision Making:   ED Management:  Patient tolerating fluids afebrile relates improvement after pain medication.  Awaiting CT results                      Clinical Impression:   Final diagnoses:  [N20.0] Kidney stone (Primary)          ED Disposition Condition    Discharge Stable        ED Prescriptions     Medication Sig Dispense Start Date End Date Auth. Provider    ondansetron (ZOFRAN) 4 MG tablet Take 1 tablet (4 mg total) by mouth every 6 (six) hours. 12 tablet 2022  Bandar Dias NP    traMADoL (ULTRAM) 50 mg tablet Take 1 tablet (50 mg total) by mouth every 6 (six) hours as needed for Pain. 6 tablet 2022  Bandar Dias NP    tamsulosin (FLOMAX) 0.4 mg Cap () Take 1 capsule (0.4 mg total) by mouth once daily. for 6 doses 6 capsule 2022 Bandar Dias NP        Follow-up Information    None          Joaquin Garcia MD  22

## 2023-04-13 ENCOUNTER — HOSPITAL ENCOUNTER (EMERGENCY)
Facility: HOSPITAL | Age: 28
Discharge: HOME | End: 2023-04-13
Attending: EMERGENCY MEDICINE
Payer: MEDICAID

## 2023-04-13 VITALS
TEMPERATURE: 98 F | HEART RATE: 102 BPM | RESPIRATION RATE: 18 BRPM | SYSTOLIC BLOOD PRESSURE: 117 MMHG | DIASTOLIC BLOOD PRESSURE: 79 MMHG | HEIGHT: 63 IN | BODY MASS INDEX: 22.15 KG/M2 | OXYGEN SATURATION: 98 % | WEIGHT: 125 LBS

## 2023-04-13 DIAGNOSIS — N39.0 URINARY TRACT INFECTION WITHOUT HEMATURIA, SITE UNSPECIFIED: Primary | ICD-10-CM

## 2023-04-13 LAB
ALBUMIN SERPL BCP-MCNC: 4.5 G/DL (ref 3.5–5.2)
ALP SERPL-CCNC: 111 U/L (ref 55–135)
ALT SERPL W/O P-5'-P-CCNC: 33 U/L (ref 10–44)
ANION GAP SERPL CALC-SCNC: 16 MMOL/L (ref 8–16)
AST SERPL-CCNC: 43 U/L (ref 10–40)
B-HCG UR QL: NEGATIVE
BACTERIA #/AREA URNS HPF: ABNORMAL /HPF
BASOPHILS # BLD AUTO: 0.07 K/UL (ref 0–0.2)
BASOPHILS NFR BLD: 0.6 % (ref 0–1.9)
BILIRUB SERPL-MCNC: 0.4 MG/DL (ref 0.1–1)
BILIRUB UR QL STRIP: NEGATIVE
BUN SERPL-MCNC: 8 MG/DL (ref 6–20)
CALCIUM SERPL-MCNC: 9.3 MG/DL (ref 8.7–10.5)
CHLORIDE SERPL-SCNC: 102 MMOL/L (ref 95–110)
CLARITY UR: ABNORMAL
CO2 SERPL-SCNC: 22 MMOL/L (ref 23–29)
COLOR UR: YELLOW
CREAT SERPL-MCNC: 0.8 MG/DL (ref 0.5–1.4)
DIFFERENTIAL METHOD: ABNORMAL
EOSINOPHIL # BLD AUTO: 0.1 K/UL (ref 0–0.5)
EOSINOPHIL NFR BLD: 0.8 % (ref 0–8)
ERYTHROCYTE [DISTWIDTH] IN BLOOD BY AUTOMATED COUNT: 12.8 % (ref 11.5–14.5)
EST. GFR  (NO RACE VARIABLE): >60 ML/MIN/1.73 M^2
GLUCOSE SERPL-MCNC: 83 MG/DL (ref 70–110)
GLUCOSE UR QL STRIP: NEGATIVE
HCT VFR BLD AUTO: 41.6 % (ref 37–48.5)
HGB BLD-MCNC: 14.5 G/DL (ref 12–16)
HGB UR QL STRIP: ABNORMAL
HYALINE CASTS #/AREA URNS LPF: 0 /LPF
IMM GRANULOCYTES # BLD AUTO: 0.04 K/UL (ref 0–0.04)
IMM GRANULOCYTES NFR BLD AUTO: 0.4 % (ref 0–0.5)
KETONES UR QL STRIP: NEGATIVE
LEUKOCYTE ESTERASE UR QL STRIP: ABNORMAL
LYMPHOCYTES # BLD AUTO: 2.7 K/UL (ref 1–4.8)
LYMPHOCYTES NFR BLD: 24.3 % (ref 18–48)
MCH RBC QN AUTO: 33.3 PG (ref 27–31)
MCHC RBC AUTO-ENTMCNC: 34.9 G/DL (ref 32–36)
MCV RBC AUTO: 96 FL (ref 82–98)
MICROSCOPIC COMMENT: ABNORMAL
MONOCYTES # BLD AUTO: 0.8 K/UL (ref 0.3–1)
MONOCYTES NFR BLD: 7.4 % (ref 4–15)
NEUTROPHILS # BLD AUTO: 7.4 K/UL (ref 1.8–7.7)
NEUTROPHILS NFR BLD: 66.5 % (ref 38–73)
NITRITE UR QL STRIP: NEGATIVE
NRBC BLD-RTO: 0 /100 WBC
PH UR STRIP: 6 [PH] (ref 5–8)
PLATELET # BLD AUTO: 288 K/UL (ref 150–450)
PMV BLD AUTO: 10.2 FL (ref 9.2–12.9)
POTASSIUM SERPL-SCNC: 3.9 MMOL/L (ref 3.5–5.1)
PROT SERPL-MCNC: 8.5 G/DL (ref 6–8.4)
PROT UR QL STRIP: ABNORMAL
RBC # BLD AUTO: 4.35 M/UL (ref 4–5.4)
RBC #/AREA URNS HPF: 11 /HPF (ref 0–4)
SODIUM SERPL-SCNC: 140 MMOL/L (ref 136–145)
SP GR UR STRIP: 1.01 (ref 1–1.03)
SQUAMOUS #/AREA URNS HPF: 3 /HPF
URN SPEC COLLECT METH UR: ABNORMAL
UROBILINOGEN UR STRIP-ACNC: NEGATIVE EU/DL
WBC # BLD AUTO: 11.17 K/UL (ref 3.9–12.7)
WBC #/AREA URNS HPF: 14 /HPF (ref 0–5)

## 2023-04-13 PROCEDURE — 81000 URINALYSIS NONAUTO W/SCOPE: CPT | Performed by: EMERGENCY MEDICINE

## 2023-04-13 PROCEDURE — 81025 URINE PREGNANCY TEST: CPT | Performed by: EMERGENCY MEDICINE

## 2023-04-13 PROCEDURE — 63600175 PHARM REV CODE 636 W HCPCS: Performed by: EMERGENCY MEDICINE

## 2023-04-13 PROCEDURE — 87186 SC STD MICRODIL/AGAR DIL: CPT | Performed by: EMERGENCY MEDICINE

## 2023-04-13 PROCEDURE — 80053 COMPREHEN METABOLIC PANEL: CPT | Performed by: EMERGENCY MEDICINE

## 2023-04-13 PROCEDURE — 87088 URINE BACTERIA CULTURE: CPT | Performed by: EMERGENCY MEDICINE

## 2023-04-13 PROCEDURE — 74176 CT ABD & PELVIS W/O CONTRAST: CPT | Mod: 26,,, | Performed by: RADIOLOGY

## 2023-04-13 PROCEDURE — 96365 THER/PROPH/DIAG IV INF INIT: CPT

## 2023-04-13 PROCEDURE — 96375 TX/PRO/DX INJ NEW DRUG ADDON: CPT

## 2023-04-13 PROCEDURE — 96366 THER/PROPH/DIAG IV INF ADDON: CPT

## 2023-04-13 PROCEDURE — 87086 URINE CULTURE/COLONY COUNT: CPT | Performed by: EMERGENCY MEDICINE

## 2023-04-13 PROCEDURE — 25000003 PHARM REV CODE 250: Performed by: EMERGENCY MEDICINE

## 2023-04-13 PROCEDURE — 87077 CULTURE AEROBIC IDENTIFY: CPT | Performed by: EMERGENCY MEDICINE

## 2023-04-13 PROCEDURE — 74176 CT ABDOMEN PELVIS WITHOUT CONTRAST: ICD-10-PCS | Mod: 26,,, | Performed by: RADIOLOGY

## 2023-04-13 PROCEDURE — 85025 COMPLETE CBC W/AUTO DIFF WBC: CPT | Performed by: EMERGENCY MEDICINE

## 2023-04-13 PROCEDURE — 74176 CT ABD & PELVIS W/O CONTRAST: CPT | Mod: TC

## 2023-04-13 PROCEDURE — 99285 EMERGENCY DEPT VISIT HI MDM: CPT | Mod: 25

## 2023-04-13 PROCEDURE — 96361 HYDRATE IV INFUSION ADD-ON: CPT

## 2023-04-13 RX ORDER — TRAMADOL HYDROCHLORIDE 50 MG/1
50 TABLET ORAL EVERY 6 HOURS PRN
Qty: 12 TABLET | Refills: 0 | Status: SHIPPED | OUTPATIENT
Start: 2023-04-13 | End: 2023-10-22 | Stop reason: CLARIF

## 2023-04-13 RX ORDER — NITROFURANTOIN 25; 75 MG/1; MG/1
100 CAPSULE ORAL 2 TIMES DAILY
Qty: 20 CAPSULE | Refills: 0 | Status: SHIPPED | OUTPATIENT
Start: 2023-04-13 | End: 2023-04-23

## 2023-04-13 RX ORDER — KETOROLAC TROMETHAMINE 30 MG/ML
30 INJECTION, SOLUTION INTRAMUSCULAR; INTRAVENOUS
Status: COMPLETED | OUTPATIENT
Start: 2023-04-13 | End: 2023-04-13

## 2023-04-13 RX ADMIN — SODIUM CHLORIDE 1000 ML: 9 INJECTION, SOLUTION INTRAVENOUS at 02:04

## 2023-04-13 RX ADMIN — KETOROLAC TROMETHAMINE 30 MG: 30 INJECTION, SOLUTION INTRAMUSCULAR; INTRAVENOUS at 02:04

## 2023-04-13 RX ADMIN — CEFTRIAXONE 2 G: 2 INJECTION, SOLUTION INTRAVENOUS at 03:04

## 2023-04-13 NOTE — ED PROVIDER NOTES
Encounter Date: 4/13/2023       History     Chief Complaint   Patient presents with    Flank Pain     Right sided started tonight    Abdominal Pain     Suprapubic x 1 week. Urgency, frequency, and painful urination.      Pt here with right side abd pain the past week now with right flank pain. She denies dysuria but she has some pressure when she voids and it was strong smelling today. No hematuria. No hx of kidney stones. No anorexia. She admits to drinking ETOH tonight after work. She denies vaginal issues and her LMP was a month ago. No NVDC.    The history is provided by the patient.   Review of patient's allergies indicates:  No Known Allergies  History reviewed. No pertinent past medical history.  History reviewed. No pertinent surgical history.  History reviewed. No pertinent family history.  Social History     Tobacco Use    Smoking status: Never    Smokeless tobacco: Never   Substance Use Topics    Alcohol use: No    Drug use: No     Review of Systems   Gastrointestinal:  Positive for abdominal pain.   Genitourinary:  Positive for flank pain, frequency and urgency.   Musculoskeletal:  Positive for back pain.   All other systems reviewed and are negative.    Physical Exam     Initial Vitals [04/13/23 0205]   BP Pulse Resp Temp SpO2   117/79 102 16 98.2 °F (36.8 °C) 98 %      MAP       --         Physical Exam    Nursing note and vitals reviewed.  Constitutional: She appears well-developed and well-nourished. She is not diaphoretic. No distress.   Mildly intoxicated appearing   HENT:   Mouth/Throat: Oropharynx is clear and moist.   Eyes: No scleral icterus.   Cardiovascular:  Regular rhythm, normal heart sounds and intact distal pulses.           tachy   Pulmonary/Chest: Breath sounds normal. She exhibits no tenderness.   Abdominal: Abdomen is soft. Bowel sounds are normal. She exhibits no distension and no mass. There is no abdominal tenderness.   +RCVAT, nontender in abd There is no rebound and no guarding.    Musculoskeletal:         General: No tenderness or edema. Normal range of motion.     Neurological: She is alert and oriented to person, place, and time.   Skin: Skin is warm and dry. Capillary refill takes less than 2 seconds. No rash noted. No erythema. No pallor.   Psychiatric: She has a normal mood and affect.       ED Course   Procedures  Labs Reviewed   URINALYSIS, REFLEX TO URINE CULTURE - Abnormal; Notable for the following components:       Result Value    Appearance, UA Hazy (*)     Protein, UA 1+ (*)     Occult Blood UA 2+ (*)     Leukocytes, UA 3+ (*)     All other components within normal limits    Narrative:     Preferred Collection Type->Urine, Clean Catch  Specimen Source->Urine   CBC W/ AUTO DIFFERENTIAL - Abnormal; Notable for the following components:    MCH 33.3 (*)     All other components within normal limits   COMPREHENSIVE METABOLIC PANEL - Abnormal; Notable for the following components:    CO2 22 (*)     Total Protein 8.5 (*)     AST 43 (*)     All other components within normal limits   URINALYSIS MICROSCOPIC - Abnormal; Notable for the following components:    RBC, UA 11 (*)     WBC, UA 14 (*)     Bacteria Few (*)     All other components within normal limits    Narrative:     Preferred Collection Type->Urine, Clean Catch  Specimen Source->Urine   CULTURE, URINE   PREGNANCY TEST, URINE RAPID    Narrative:     Specimen Source->Urine          Imaging Results              CT Abdomen Pelvis  Without Contrast (In process)                      Medications   sodium chloride 0.9% bolus 1,000 mL 1,000 mL (0 mLs Intravenous Stopped 4/13/23 0342)   ketorolac injection 30 mg (30 mg Intravenous Given 4/13/23 0225)   cefTRIAXone (ROCEPHIN) 2 g/50 mL D5W IVPB (2 g Intravenous New Bag 4/13/23 0304)     Medical Decision Making:   Differential Diagnosis:   UTI, pyelo, renal colic  Clinical Tests:   Lab Tests: Ordered and Reviewed  Radiological Study: Ordered and Reviewed  ED Management:  Pt presented with  "right flank pain. Benign abd. CBC, CMP and UPT unremarkable. UA showed infection. She was given 1L NS and rocephin 2g. Toradol for pain. She was advised to f/u with her PCP in the next week. Rx macrobid. Return precautions discussed.            ED Course as of 04/13/23 0442   Thu Apr 13, 2023   0219 ED chart from 1/2022 reviewed. Pt with dx of "kidney stone" but this was in the kidney and not the ureter.  [DC]   0438 CT abd neg per Vrads [DC]      ED Course User Index  [DC] Greg Landrum Jr., MD                 Clinical Impression:   Final diagnoses:  [N39.0] Urinary tract infection without hematuria, site unspecified (Primary)        ED Disposition Condition    Discharge Stable          ED Prescriptions       Medication Sig Dispense Start Date End Date Auth. Provider    nitrofurantoin, macrocrystal-monohydrate, (MACROBID) 100 MG capsule Take 1 capsule (100 mg total) by mouth 2 (two) times daily. for 10 days 20 capsule 4/13/2023 4/23/2023 Greg Landrum Jr., MD    traMADoL (ULTRAM) 50 mg tablet Take 1 tablet (50 mg total) by mouth every 6 (six) hours as needed for Pain. 12 tablet 4/13/2023 -- Greg Landrum Jr., MD          Follow-up Information       Follow up With Specialties Details Why Contact Info    primary care clinic  Schedule an appointment as soon as possible for a visit                Greg Landrum Jr., MD  04/13/23 0442    "

## 2023-04-16 LAB — BACTERIA UR CULT: ABNORMAL

## 2023-10-21 PROCEDURE — 99284 EMERGENCY DEPT VISIT MOD MDM: CPT | Mod: 25

## 2023-10-22 ENCOUNTER — HOSPITAL ENCOUNTER (EMERGENCY)
Facility: HOSPITAL | Age: 28
Discharge: HOME | End: 2023-10-22
Attending: EMERGENCY MEDICINE
Payer: MEDICAID

## 2023-10-22 VITALS
HEART RATE: 106 BPM | OXYGEN SATURATION: 97 % | HEIGHT: 63 IN | BODY MASS INDEX: 22.15 KG/M2 | DIASTOLIC BLOOD PRESSURE: 91 MMHG | TEMPERATURE: 98 F | SYSTOLIC BLOOD PRESSURE: 118 MMHG | WEIGHT: 125 LBS | RESPIRATION RATE: 20 BRPM

## 2023-10-22 DIAGNOSIS — F10.920 ALCOHOLIC INTOXICATION WITHOUT COMPLICATION: Primary | ICD-10-CM

## 2023-10-22 DIAGNOSIS — F10.20 ALCOHOLISM: ICD-10-CM

## 2023-10-22 LAB
ALBUMIN SERPL BCP-MCNC: 4.4 G/DL (ref 3.5–5.2)
ALP SERPL-CCNC: 105 U/L (ref 55–135)
ALT SERPL W/O P-5'-P-CCNC: 76 U/L (ref 10–44)
AMPHET+METHAMPHET UR QL: NEGATIVE
ANION GAP SERPL CALC-SCNC: 14 MMOL/L (ref 8–16)
AST SERPL-CCNC: 138 U/L (ref 10–40)
BACTERIA #/AREA URNS HPF: ABNORMAL /HPF
BARBITURATES UR QL SCN>200 NG/ML: NEGATIVE
BASOPHILS # BLD AUTO: 0.05 K/UL (ref 0–0.2)
BASOPHILS NFR BLD: 1.1 % (ref 0–1.9)
BENZODIAZ UR QL SCN>200 NG/ML: NEGATIVE
BILIRUB SERPL-MCNC: 0.6 MG/DL (ref 0.1–1)
BILIRUB UR QL STRIP: NEGATIVE
BUN SERPL-MCNC: 7 MG/DL (ref 6–20)
BZE UR QL SCN: NEGATIVE
CALCIUM SERPL-MCNC: 9.2 MG/DL (ref 8.7–10.5)
CANNABINOIDS UR QL SCN: ABNORMAL
CHLORIDE SERPL-SCNC: 103 MMOL/L (ref 95–110)
CLARITY UR: CLEAR
CO2 SERPL-SCNC: 25 MMOL/L (ref 23–29)
COLOR UR: YELLOW
CREAT SERPL-MCNC: 0.8 MG/DL (ref 0.5–1.4)
CREAT UR-MCNC: 34.3 MG/DL (ref 15–325)
DIFFERENTIAL METHOD: ABNORMAL
EOSINOPHIL # BLD AUTO: 0.1 K/UL (ref 0–0.5)
EOSINOPHIL NFR BLD: 1.1 % (ref 0–8)
ERYTHROCYTE [DISTWIDTH] IN BLOOD BY AUTOMATED COUNT: 11.8 % (ref 11.5–14.5)
EST. GFR  (NO RACE VARIABLE): >60 ML/MIN/1.73 M^2
ETHANOL SERPL-MCNC: 349 MG/DL (ref 0–10)
GLUCOSE SERPL-MCNC: 85 MG/DL (ref 70–110)
GLUCOSE UR QL STRIP: NEGATIVE
HCT VFR BLD AUTO: 40.6 % (ref 37–48.5)
HGB BLD-MCNC: 14.1 G/DL (ref 12–16)
HGB UR QL STRIP: ABNORMAL
IMM GRANULOCYTES # BLD AUTO: 0.01 K/UL (ref 0–0.04)
IMM GRANULOCYTES NFR BLD AUTO: 0.2 % (ref 0–0.5)
KETONES UR QL STRIP: NEGATIVE
LEUKOCYTE ESTERASE UR QL STRIP: NEGATIVE
LYMPHOCYTES # BLD AUTO: 2.1 K/UL (ref 1–4.8)
LYMPHOCYTES NFR BLD: 45.9 % (ref 18–48)
MAGNESIUM SERPL-MCNC: 1.7 MG/DL (ref 1.6–2.6)
MCH RBC QN AUTO: 34.3 PG (ref 27–31)
MCHC RBC AUTO-ENTMCNC: 34.7 G/DL (ref 32–36)
MCV RBC AUTO: 99 FL (ref 82–98)
METHADONE UR QL SCN>300 NG/ML: NEGATIVE
MICROSCOPIC COMMENT: ABNORMAL
MONOCYTES # BLD AUTO: 0.5 K/UL (ref 0.3–1)
MONOCYTES NFR BLD: 10.8 % (ref 4–15)
NEUTROPHILS # BLD AUTO: 1.9 K/UL (ref 1.8–7.7)
NEUTROPHILS NFR BLD: 40.9 % (ref 38–73)
NITRITE UR QL STRIP: NEGATIVE
NRBC BLD-RTO: 0 /100 WBC
OPIATES UR QL SCN: NEGATIVE
PCP UR QL SCN>25 NG/ML: NEGATIVE
PH UR STRIP: 7 [PH] (ref 5–8)
PLATELET # BLD AUTO: 260 K/UL (ref 150–450)
PMV BLD AUTO: 10.2 FL (ref 9.2–12.9)
POTASSIUM SERPL-SCNC: 3.6 MMOL/L (ref 3.5–5.1)
PROT SERPL-MCNC: 7.9 G/DL (ref 6–8.4)
PROT UR QL STRIP: NEGATIVE
RBC # BLD AUTO: 4.11 M/UL (ref 4–5.4)
RBC #/AREA URNS HPF: 11 /HPF (ref 0–4)
SODIUM SERPL-SCNC: 142 MMOL/L (ref 136–145)
SP GR UR STRIP: <=1.005 (ref 1–1.03)
SQUAMOUS #/AREA URNS HPF: 3 /HPF
TOXICOLOGY INFORMATION: ABNORMAL
URN SPEC COLLECT METH UR: ABNORMAL
UROBILINOGEN UR STRIP-ACNC: NEGATIVE EU/DL
WBC # BLD AUTO: 4.62 K/UL (ref 3.9–12.7)
WBC #/AREA URNS HPF: 2 /HPF (ref 0–5)

## 2023-10-22 PROCEDURE — 81000 URINALYSIS NONAUTO W/SCOPE: CPT | Mod: 59 | Performed by: EMERGENCY MEDICINE

## 2023-10-22 PROCEDURE — 82077 ASSAY SPEC XCP UR&BREATH IA: CPT | Performed by: EMERGENCY MEDICINE

## 2023-10-22 PROCEDURE — 96361 HYDRATE IV INFUSION ADD-ON: CPT

## 2023-10-22 PROCEDURE — 96360 HYDRATION IV INFUSION INIT: CPT

## 2023-10-22 PROCEDURE — 83735 ASSAY OF MAGNESIUM: CPT | Performed by: EMERGENCY MEDICINE

## 2023-10-22 PROCEDURE — 80053 COMPREHEN METABOLIC PANEL: CPT | Performed by: EMERGENCY MEDICINE

## 2023-10-22 PROCEDURE — 85025 COMPLETE CBC W/AUTO DIFF WBC: CPT | Performed by: EMERGENCY MEDICINE

## 2023-10-22 PROCEDURE — 25000003 PHARM REV CODE 250: Performed by: EMERGENCY MEDICINE

## 2023-10-22 PROCEDURE — 63600175 PHARM REV CODE 636 W HCPCS: Performed by: EMERGENCY MEDICINE

## 2023-10-22 PROCEDURE — 80307 DRUG TEST PRSMV CHEM ANLYZR: CPT | Performed by: EMERGENCY MEDICINE

## 2023-10-22 RX ORDER — CHLORDIAZEPOXIDE HYDROCHLORIDE 25 MG/1
25 CAPSULE, GELATIN COATED ORAL EVERY 8 HOURS PRN
Qty: 30 CAPSULE | Refills: 0 | Status: SHIPPED | OUTPATIENT
Start: 2023-10-22

## 2023-10-22 RX ORDER — THIAMINE HYDROCHLORIDE 100 MG/ML
INJECTION, SOLUTION INTRAMUSCULAR; INTRAVENOUS
Status: DISCONTINUED
Start: 2023-10-22 | End: 2023-10-22 | Stop reason: HOSPADM

## 2023-10-22 RX ADMIN — FOLIC ACID: 5 INJECTION, SOLUTION INTRAMUSCULAR; INTRAVENOUS; SUBCUTANEOUS at 01:10

## 2023-10-22 NOTE — ED TRIAGE NOTES
Pt states that she has been drinking alcohol is in rehab and has been drinking and scared she may go into DTs

## 2023-10-22 NOTE — ED PROVIDER NOTES
Encounter Date: 10/21/2023       History     Chief Complaint   Patient presents with    Alcohol Problem     Pt states that she has been drinking for a long time and wants to stop but thinks she may go into dt     Pt with hx of ETOHism here with c/o intoxication and wanting help bc if she doesn't take a drink in 4-6hrs she will get withdrawal symptoms. She denies pain. No NVD. She reports some LAP and dysuria. Denies being pregnant. She denies drug abuse other than occasional marijuana. She says she drinks 1-1.5 pints of vodka daily.    The history is provided by the patient and a friend.     Review of patient's allergies indicates:  No Known Allergies  History reviewed. No pertinent past medical history.  History reviewed. No pertinent surgical history.  History reviewed. No pertinent family history.  Social History     Tobacco Use    Smoking status: Never    Smokeless tobacco: Never   Substance Use Topics    Alcohol use: No    Drug use: Yes     Types: Marijuana     Review of Systems   Constitutional:  Positive for fatigue.   Genitourinary:  Positive for dysuria and pelvic pain.   Neurological:  Positive for tremors and light-headedness. Negative for seizures.   All other systems reviewed and are negative.      Physical Exam     Initial Vitals [10/22/23 0024]   BP Pulse Resp Temp SpO2   (!) 118/91 106 20 97.7 °F (36.5 °C) 99 %      MAP       --         Physical Exam    Nursing note and vitals reviewed.  Constitutional: She appears well-developed and well-nourished. She is not diaphoretic. No distress.   Mildly intoxicated   HENT:   Mouth/Throat: Oropharynx is clear and moist.   Eyes: Conjunctivae and EOM are normal. No scleral icterus.   Neck: Neck supple. No thyromegaly present.   Normal range of motion.  Cardiovascular:  Normal rate, regular rhythm, normal heart sounds and intact distal pulses.           Pulmonary/Chest: Breath sounds normal.   Abdominal: Abdomen is soft. She exhibits no distension. There is no  abdominal tenderness.   Musculoskeletal:         General: No tenderness or edema. Normal range of motion.      Cervical back: Normal range of motion and neck supple.     Neurological: She is alert and oriented to person, place, and time. She has normal strength. No cranial nerve deficit or sensory deficit. GCS score is 15. GCS eye subscore is 4. GCS verbal subscore is 5. GCS motor subscore is 6.   Skin: Skin is warm and dry. Capillary refill takes less than 2 seconds. No rash noted. No erythema. No pallor.   Psychiatric: She has a normal mood and affect.         ED Course   Procedures  Labs Reviewed   CBC W/ AUTO DIFFERENTIAL - Abnormal; Notable for the following components:       Result Value    MCV 99 (*)     MCH 34.3 (*)     All other components within normal limits   COMPREHENSIVE METABOLIC PANEL - Abnormal; Notable for the following components:     (*)     ALT 76 (*)     All other components within normal limits   URINALYSIS, REFLEX TO URINE CULTURE - Abnormal; Notable for the following components:    Occult Blood UA 1+ (*)     All other components within normal limits    Narrative:     Preferred Collection Type->Urine, Clean Catch  Specimen Source->Urine   DRUG SCREEN PANEL, URINE EMERGENCY - Abnormal; Notable for the following components:    THC Presumptive Positive (*)     All other components within normal limits    Narrative:     Preferred Collection Type->Urine, Clean Catch  Specimen Source->Urine   ALCOHOL,MEDICAL (ETHANOL) - Abnormal; Notable for the following components:    Alcohol, Serum 349 (*)     All other components within normal limits    Narrative:     Alcohol critical result(s) called and verbal readback obtained from   Sha Talavera RN. by TH3 10/22/2023 01:29   URINALYSIS MICROSCOPIC - Abnormal; Notable for the following components:    RBC, UA 11 (*)     Bacteria Many (*)     All other components within normal limits    Narrative:     Preferred Collection Type->Urine, Clean  Catch  Specimen Source->Urine   MAGNESIUM          Imaging Results    None          Medications   thiamine (B-1) 100 mg/mL injection (has no administration in time range)   mvi, (ADULT) no.4 with vit K 3,300 unit- 150 mcg/10 mL injection Soln (has no administration in time range)   sodium chloride 0.9% 1,000 mL with mvi, (ADULT) no.4 with vit K 3,300 unit- 150 mcg/10 mL 10 mL, thiamine 100 mg, folic acid 1 mg infusion ( Intravenous New Bag 10/22/23 0136)     Medical Decision Making  Pt with hx of alcoholism presented with c/o intoxication and concerns about withdrawal when she tries to stop drinking. She had no organic complaints other than some mild dysuria. Neg UA, however. CBC and Mg unremarkable. UDS pos for THC. ETOH 349. CMP unremarkable except for trivially elevated AST/ALT. She was given a banana bag. Plan dc home after with librium Rx and outpatient detox info.     Amount and/or Complexity of Data Reviewed  Labs: ordered. Decision-making details documented in ED Course.    Risk  Prescription drug management.                               Clinical Impression:   Final diagnoses:  [F10.920] Alcoholic intoxication without complication (Primary)  [F10.20] Alcoholism        ED Disposition Condition    Discharge Stable          ED Prescriptions       Medication Sig Dispense Start Date End Date Auth. Provider    chlordiazepoxide (LIBRIUM) 25 MG Cap Take 1 capsule (25 mg total) by mouth every 8 (eight) hours as needed. 30 capsule 10/22/2023 -- Greg Landrum Jr., MD          Follow-up Information       Follow up With Specialties Details Why Contact Info    primary care clinic  Schedule an appointment as soon as possible for a visit                Greg Landrum Jr., MD  10/22/23 5493

## 2023-10-22 NOTE — ED NOTES
Patient arrives at request of her friends to be evaluated for etoh abuse; Friends at bedside state they are concerned pt has been drinking daily and might go into DT's if she stops; Pt is laughing and states she has been drinking about one pint of vodka daily

## 2023-10-23 ENCOUNTER — PATIENT OUTREACH (OUTPATIENT)
Dept: ADMINISTRATIVE | Facility: OTHER | Age: 28
End: 2023-10-23
Payer: MEDICAID

## 2023-10-23 NOTE — PROGRESS NOTES
[facility] - Outreach Attempt    Community Health Worker left a voicemail message for 1st attempt to contact patient regarding: community resources    Community Health Worker to attempt to contact patient on: 10/23/23

## 2023-10-24 NOTE — PROGRESS NOTES
CHW - Case Closure    This Community Health Worker spoke to patient via telephone today.     Pt/Caregiver reported: At 11:33 AM Pt called CHW back after CHW left vm for pt yesterday   Pt stated she will call CHW back at 3:30 pm or earlier    Pt/Caregiver denied any additional needs at this time and agrees with episode closure at this time.  Provided patient with Community Health Worker's contact information and encouraged him/her to contact this Community Health Worker if additional needs arise.

## 2023-11-08 ENCOUNTER — HOSPITAL ENCOUNTER (OUTPATIENT)
Facility: HOSPITAL | Age: 28
Discharge: PSYCHIATRIC OR SUBSTANCE USE FACILITY | End: 2023-11-10
Attending: EMERGENCY MEDICINE | Admitting: INTERNAL MEDICINE
Payer: MEDICAID

## 2023-11-08 DIAGNOSIS — T43.212A INTENTIONAL OVERDOSE OF TRAZODONE: ICD-10-CM

## 2023-11-08 DIAGNOSIS — F10.920 ALCOHOLIC INTOXICATION WITHOUT COMPLICATION: Primary | ICD-10-CM

## 2023-11-08 DIAGNOSIS — R07.9 CHEST PAIN: ICD-10-CM

## 2023-11-08 DIAGNOSIS — Z00.8 MEDICAL CLEARANCE FOR PSYCHIATRIC ADMISSION: ICD-10-CM

## 2023-11-08 PROBLEM — F10.10 ALCOHOL ABUSE: Status: ACTIVE | Noted: 2023-11-08

## 2023-11-08 PROBLEM — F12.10 MARIJUANA ABUSE: Status: ACTIVE | Noted: 2023-11-08

## 2023-11-08 PROBLEM — K70.0 FATTY LIVER DUE TO ALCOHOLISM: Status: ACTIVE | Noted: 2023-11-08

## 2023-11-08 LAB
ALBUMIN SERPL BCP-MCNC: 4.2 G/DL (ref 3.5–5.2)
ALP SERPL-CCNC: 89 U/L (ref 55–135)
ALT SERPL W/O P-5'-P-CCNC: 50 U/L (ref 10–44)
AMPHET+METHAMPHET UR QL: NEGATIVE
ANION GAP SERPL CALC-SCNC: 14 MMOL/L (ref 8–16)
APAP SERPL-MCNC: <3 UG/ML (ref 10–20)
AST SERPL-CCNC: 96 U/L (ref 10–40)
B-HCG UR QL: NEGATIVE
BARBITURATES UR QL SCN>200 NG/ML: NEGATIVE
BASOPHILS # BLD AUTO: 0.09 K/UL (ref 0–0.2)
BASOPHILS NFR BLD: 2.1 % (ref 0–1.9)
BENZODIAZ UR QL SCN>200 NG/ML: ABNORMAL
BILIRUB SERPL-MCNC: 0.3 MG/DL (ref 0.1–1)
BILIRUB UR QL STRIP: NEGATIVE
BUN SERPL-MCNC: 8 MG/DL (ref 6–20)
BZE UR QL SCN: NEGATIVE
CALCIUM SERPL-MCNC: 8.7 MG/DL (ref 8.7–10.5)
CANNABINOIDS UR QL SCN: ABNORMAL
CHLORIDE SERPL-SCNC: 108 MMOL/L (ref 95–110)
CLARITY UR: ABNORMAL
CO2 SERPL-SCNC: 22 MMOL/L (ref 23–29)
COLOR UR: YELLOW
CREAT SERPL-MCNC: 0.8 MG/DL (ref 0.5–1.4)
CREAT UR-MCNC: 81.3 MG/DL (ref 15–325)
DIFFERENTIAL METHOD: ABNORMAL
EOSINOPHIL # BLD AUTO: 0.1 K/UL (ref 0–0.5)
EOSINOPHIL NFR BLD: 1.6 % (ref 0–8)
ERYTHROCYTE [DISTWIDTH] IN BLOOD BY AUTOMATED COUNT: 11.5 % (ref 11.5–14.5)
EST. GFR  (NO RACE VARIABLE): >60 ML/MIN/1.73 M^2
ETHANOL SERPL-MCNC: 197 MG/DL (ref 0–10)
GLUCOSE SERPL-MCNC: 81 MG/DL (ref 70–110)
GLUCOSE UR QL STRIP: NEGATIVE
HCT VFR BLD AUTO: 38.5 % (ref 37–48.5)
HGB BLD-MCNC: 13.2 G/DL (ref 12–16)
HGB UR QL STRIP: ABNORMAL
IMM GRANULOCYTES # BLD AUTO: 0.03 K/UL (ref 0–0.04)
IMM GRANULOCYTES NFR BLD AUTO: 0.7 % (ref 0–0.5)
KETONES UR QL STRIP: NEGATIVE
LEUKOCYTE ESTERASE UR QL STRIP: NEGATIVE
LYMPHOCYTES # BLD AUTO: 1.8 K/UL (ref 1–4.8)
LYMPHOCYTES NFR BLD: 41.4 % (ref 18–48)
MAGNESIUM SERPL-MCNC: 1.8 MG/DL (ref 1.6–2.6)
MCH RBC QN AUTO: 34.6 PG (ref 27–31)
MCHC RBC AUTO-ENTMCNC: 34.3 G/DL (ref 32–36)
MCV RBC AUTO: 101 FL (ref 82–98)
METHADONE UR QL SCN>300 NG/ML: NEGATIVE
MONOCYTES # BLD AUTO: 0.3 K/UL (ref 0.3–1)
MONOCYTES NFR BLD: 7.2 % (ref 4–15)
NEUTROPHILS # BLD AUTO: 2 K/UL (ref 1.8–7.7)
NEUTROPHILS NFR BLD: 47 % (ref 38–73)
NITRITE UR QL STRIP: NEGATIVE
NRBC BLD-RTO: 0 /100 WBC
OPIATES UR QL SCN: NEGATIVE
PCP UR QL SCN>25 NG/ML: NEGATIVE
PH UR STRIP: 6 [PH] (ref 5–8)
PHOSPHATE SERPL-MCNC: 3.6 MG/DL (ref 2.7–4.5)
PLATELET # BLD AUTO: 232 K/UL (ref 150–450)
PMV BLD AUTO: 10.4 FL (ref 9.2–12.9)
POTASSIUM SERPL-SCNC: 4 MMOL/L (ref 3.5–5.1)
PROT SERPL-MCNC: 7.4 G/DL (ref 6–8.4)
PROT UR QL STRIP: ABNORMAL
RBC # BLD AUTO: 3.81 M/UL (ref 4–5.4)
SALICYLATES SERPL-MCNC: <5 MG/DL (ref 15–30)
SARS-COV-2 RDRP RESP QL NAA+PROBE: NEGATIVE
SODIUM SERPL-SCNC: 144 MMOL/L (ref 136–145)
SP GR UR STRIP: 1.02 (ref 1–1.03)
TOXICOLOGY INFORMATION: ABNORMAL
TSH SERPL DL<=0.005 MIU/L-ACNC: 2.31 UIU/ML (ref 0.4–4)
URN SPEC COLLECT METH UR: ABNORMAL
UROBILINOGEN UR STRIP-ACNC: NEGATIVE EU/DL
WBC # BLD AUTO: 4.3 K/UL (ref 3.9–12.7)

## 2023-11-08 PROCEDURE — 96366 THER/PROPH/DIAG IV INF ADDON: CPT

## 2023-11-08 PROCEDURE — 25000003 PHARM REV CODE 250: Performed by: INTERNAL MEDICINE

## 2023-11-08 PROCEDURE — 99223 1ST HOSP IP/OBS HIGH 75: CPT | Mod: ,,, | Performed by: INTERNAL MEDICINE

## 2023-11-08 PROCEDURE — 63600175 PHARM REV CODE 636 W HCPCS: Performed by: INTERNAL MEDICINE

## 2023-11-08 PROCEDURE — 81025 URINE PREGNANCY TEST: CPT | Performed by: INTERNAL MEDICINE

## 2023-11-08 PROCEDURE — U0002 COVID-19 LAB TEST NON-CDC: HCPCS | Performed by: EMERGENCY MEDICINE

## 2023-11-08 PROCEDURE — G0378 HOSPITAL OBSERVATION PER HR: HCPCS

## 2023-11-08 PROCEDURE — 96365 THER/PROPH/DIAG IV INF INIT: CPT

## 2023-11-08 PROCEDURE — 96361 HYDRATE IV INFUSION ADD-ON: CPT

## 2023-11-08 PROCEDURE — 25000003 PHARM REV CODE 250: Performed by: EMERGENCY MEDICINE

## 2023-11-08 PROCEDURE — 80053 COMPREHEN METABOLIC PANEL: CPT | Performed by: EMERGENCY MEDICINE

## 2023-11-08 PROCEDURE — 85025 COMPLETE CBC W/AUTO DIFF WBC: CPT | Performed by: EMERGENCY MEDICINE

## 2023-11-08 PROCEDURE — 84443 ASSAY THYROID STIM HORMONE: CPT | Performed by: EMERGENCY MEDICINE

## 2023-11-08 PROCEDURE — 99205 OFFICE O/P NEW HI 60 MIN: CPT | Mod: AF,HB,, | Performed by: PSYCHIATRY & NEUROLOGY

## 2023-11-08 PROCEDURE — 93010 ELECTROCARDIOGRAM REPORT: CPT | Mod: 76,,, | Performed by: INTERNAL MEDICINE

## 2023-11-08 PROCEDURE — 63600175 PHARM REV CODE 636 W HCPCS: Performed by: EMERGENCY MEDICINE

## 2023-11-08 PROCEDURE — 90833 PSYTX W PT W E/M 30 MIN: CPT | Mod: AF,HB,, | Performed by: PSYCHIATRY & NEUROLOGY

## 2023-11-08 PROCEDURE — 80143 DRUG ASSAY ACETAMINOPHEN: CPT | Performed by: EMERGENCY MEDICINE

## 2023-11-08 PROCEDURE — 99223 PR INITIAL HOSPITAL CARE,LEVL III: ICD-10-PCS | Mod: ,,, | Performed by: INTERNAL MEDICINE

## 2023-11-08 PROCEDURE — 99205 PR OFFICE/OUTPT VISIT, NEW, LEVL V, 60-74 MIN: ICD-10-PCS | Mod: AF,HB,, | Performed by: PSYCHIATRY & NEUROLOGY

## 2023-11-08 PROCEDURE — 80307 DRUG TEST PRSMV CHEM ANLYZR: CPT | Performed by: INTERNAL MEDICINE

## 2023-11-08 PROCEDURE — 71045 X-RAY EXAM CHEST 1 VIEW: CPT | Mod: TC

## 2023-11-08 PROCEDURE — 25000003 PHARM REV CODE 250: Performed by: STUDENT IN AN ORGANIZED HEALTH CARE EDUCATION/TRAINING PROGRAM

## 2023-11-08 PROCEDURE — 84100 ASSAY OF PHOSPHORUS: CPT | Performed by: EMERGENCY MEDICINE

## 2023-11-08 PROCEDURE — 93010 ELECTROCARDIOGRAM REPORT: CPT | Mod: ,,, | Performed by: INTERNAL MEDICINE

## 2023-11-08 PROCEDURE — 93005 ELECTROCARDIOGRAM TRACING: CPT

## 2023-11-08 PROCEDURE — 63600175 PHARM REV CODE 636 W HCPCS: Performed by: STUDENT IN AN ORGANIZED HEALTH CARE EDUCATION/TRAINING PROGRAM

## 2023-11-08 PROCEDURE — 90833 PR PSYCHOTHERAPY W/PATIENT W/E&M, 30 MIN (ADD ON): ICD-10-PCS | Mod: AF,HB,, | Performed by: PSYCHIATRY & NEUROLOGY

## 2023-11-08 PROCEDURE — 81003 URINALYSIS AUTO W/O SCOPE: CPT | Mod: 59 | Performed by: INTERNAL MEDICINE

## 2023-11-08 PROCEDURE — 96368 THER/DIAG CONCURRENT INF: CPT

## 2023-11-08 PROCEDURE — 80179 DRUG ASSAY SALICYLATE: CPT | Performed by: EMERGENCY MEDICINE

## 2023-11-08 PROCEDURE — 82077 ASSAY SPEC XCP UR&BREATH IA: CPT | Performed by: EMERGENCY MEDICINE

## 2023-11-08 PROCEDURE — 71045 XR CHEST AP PORTABLE: ICD-10-PCS | Mod: 26,,, | Performed by: RADIOLOGY

## 2023-11-08 PROCEDURE — 71045 X-RAY EXAM CHEST 1 VIEW: CPT | Mod: 26,,, | Performed by: RADIOLOGY

## 2023-11-08 PROCEDURE — 83735 ASSAY OF MAGNESIUM: CPT | Performed by: EMERGENCY MEDICINE

## 2023-11-08 PROCEDURE — 99285 EMERGENCY DEPT VISIT HI MDM: CPT | Mod: 25

## 2023-11-08 PROCEDURE — 93010 EKG 12-LEAD: ICD-10-PCS | Mod: ,,, | Performed by: INTERNAL MEDICINE

## 2023-11-08 RX ORDER — SIMETHICONE 80 MG
1 TABLET,CHEWABLE ORAL 4 TIMES DAILY PRN
Status: DISCONTINUED | OUTPATIENT
Start: 2023-11-08 | End: 2023-11-10 | Stop reason: HOSPADM

## 2023-11-08 RX ORDER — IBUPROFEN 200 MG
16 TABLET ORAL
Status: DISCONTINUED | OUTPATIENT
Start: 2023-11-08 | End: 2023-11-10 | Stop reason: HOSPADM

## 2023-11-08 RX ORDER — DEXTROSE MONOHYDRATE AND SODIUM CHLORIDE 5; .9 G/100ML; G/100ML
INJECTION, SOLUTION INTRAVENOUS CONTINUOUS
Status: DISCONTINUED | OUTPATIENT
Start: 2023-11-08 | End: 2023-11-08

## 2023-11-08 RX ORDER — NALOXONE HCL 0.4 MG/ML
0.02 VIAL (ML) INJECTION
Status: DISCONTINUED | OUTPATIENT
Start: 2023-11-08 | End: 2023-11-10 | Stop reason: HOSPADM

## 2023-11-08 RX ORDER — THIAMINE HYDROCHLORIDE 100 MG/ML
INJECTION, SOLUTION INTRAMUSCULAR; INTRAVENOUS
Status: DISPENSED
Start: 2023-11-08 | End: 2023-11-08

## 2023-11-08 RX ORDER — SODIUM CHLORIDE 0.9 % (FLUSH) 0.9 %
10 SYRINGE (ML) INJECTION EVERY 12 HOURS PRN
Status: DISCONTINUED | OUTPATIENT
Start: 2023-11-08 | End: 2023-11-10 | Stop reason: HOSPADM

## 2023-11-08 RX ORDER — MIDODRINE HYDROCHLORIDE 2.5 MG/1
2.5 TABLET ORAL 3 TIMES DAILY
Status: DISCONTINUED | OUTPATIENT
Start: 2023-11-08 | End: 2023-11-09

## 2023-11-08 RX ORDER — POLYETHYLENE GLYCOL 3350 17 G/17G
17 POWDER, FOR SOLUTION ORAL DAILY
Status: DISCONTINUED | OUTPATIENT
Start: 2023-11-08 | End: 2023-11-10 | Stop reason: HOSPADM

## 2023-11-08 RX ORDER — GLUCAGON 1 MG
1 KIT INJECTION
Status: DISCONTINUED | OUTPATIENT
Start: 2023-11-08 | End: 2023-11-10 | Stop reason: HOSPADM

## 2023-11-08 RX ORDER — IBUPROFEN 200 MG
24 TABLET ORAL
Status: DISCONTINUED | OUTPATIENT
Start: 2023-11-08 | End: 2023-11-10 | Stop reason: HOSPADM

## 2023-11-08 RX ORDER — FOLIC ACID 1 MG/1
1 TABLET ORAL DAILY
Status: DISCONTINUED | OUTPATIENT
Start: 2023-11-08 | End: 2023-11-10 | Stop reason: HOSPADM

## 2023-11-08 RX ORDER — THIAMINE HCL 100 MG
100 TABLET ORAL DAILY
Status: DISCONTINUED | OUTPATIENT
Start: 2023-11-08 | End: 2023-11-10 | Stop reason: HOSPADM

## 2023-11-08 RX ORDER — PANTOPRAZOLE SODIUM 40 MG/1
40 TABLET, DELAYED RELEASE ORAL DAILY
Status: DISCONTINUED | OUTPATIENT
Start: 2023-11-08 | End: 2023-11-10 | Stop reason: HOSPADM

## 2023-11-08 RX ORDER — MAG HYDROX/ALUMINUM HYD/SIMETH 200-200-20
30 SUSPENSION, ORAL (FINAL DOSE FORM) ORAL 4 TIMES DAILY PRN
Status: DISCONTINUED | OUTPATIENT
Start: 2023-11-08 | End: 2023-11-10 | Stop reason: HOSPADM

## 2023-11-08 RX ORDER — LORAZEPAM 1 MG/1
1 TABLET ORAL EVERY 8 HOURS PRN
Status: DISCONTINUED | OUTPATIENT
Start: 2023-11-08 | End: 2023-11-10 | Stop reason: HOSPADM

## 2023-11-08 RX ORDER — ACETAMINOPHEN 500 MG
500 TABLET ORAL EVERY 6 HOURS PRN
Status: DISCONTINUED | OUTPATIENT
Start: 2023-11-08 | End: 2023-11-10 | Stop reason: HOSPADM

## 2023-11-08 RX ORDER — MAGNESIUM SULFATE 1 G/100ML
1 INJECTION INTRAVENOUS ONCE
Status: COMPLETED | OUTPATIENT
Start: 2023-11-08 | End: 2023-11-08

## 2023-11-08 RX ORDER — AMOXICILLIN 250 MG
1 CAPSULE ORAL 2 TIMES DAILY PRN
Status: DISCONTINUED | OUTPATIENT
Start: 2023-11-08 | End: 2023-11-10 | Stop reason: HOSPADM

## 2023-11-08 RX ORDER — MIDODRINE HYDROCHLORIDE 2.5 MG/1
2.5 TABLET ORAL ONCE
Status: DISCONTINUED | OUTPATIENT
Start: 2023-11-08 | End: 2023-11-08

## 2023-11-08 RX ORDER — DEXTROSE MONOHYDRATE, SODIUM CHLORIDE, AND POTASSIUM CHLORIDE 50; 1.49; 9 G/1000ML; G/1000ML; G/1000ML
INJECTION, SOLUTION INTRAVENOUS CONTINUOUS
Status: DISCONTINUED | OUTPATIENT
Start: 2023-11-08 | End: 2023-11-09

## 2023-11-08 RX ADMIN — THIAMINE HCL TAB 100 MG 100 MG: 100 TAB at 10:11

## 2023-11-08 RX ADMIN — SODIUM CHLORIDE 1000 ML: 9 INJECTION, SOLUTION INTRAVENOUS at 03:11

## 2023-11-08 RX ADMIN — PANTOPRAZOLE SODIUM 40 MG: 40 TABLET, DELAYED RELEASE ORAL at 05:11

## 2023-11-08 RX ADMIN — FOLIC ACID 1 MG: 1 TABLET ORAL at 10:11

## 2023-11-08 RX ADMIN — SODIUM CHLORIDE, POTASSIUM CHLORIDE, SODIUM LACTATE AND CALCIUM CHLORIDE 1000 ML: 600; 310; 30; 20 INJECTION, SOLUTION INTRAVENOUS at 11:11

## 2023-11-08 RX ADMIN — SODIUM CHLORIDE, POTASSIUM CHLORIDE, SODIUM LACTATE AND CALCIUM CHLORIDE 500 ML: 600; 310; 30; 20 INJECTION, SOLUTION INTRAVENOUS at 08:11

## 2023-11-08 RX ADMIN — THERA TABS 1 TABLET: TAB at 10:11

## 2023-11-08 RX ADMIN — MIDODRINE HYDROCHLORIDE 2.5 MG: 2.5 TABLET ORAL at 04:11

## 2023-11-08 RX ADMIN — MAGNESIUM SULFATE IN DEXTROSE 1 G: 10 INJECTION, SOLUTION INTRAVENOUS at 06:11

## 2023-11-08 RX ADMIN — SODIUM CHLORIDE 1000 ML: 9 INJECTION, SOLUTION INTRAVENOUS at 05:11

## 2023-11-08 RX ADMIN — POTASSIUM CHLORIDE, DEXTROSE MONOHYDRATE AND SODIUM CHLORIDE: 150; 5; 900 INJECTION, SOLUTION INTRAVENOUS at 07:11

## 2023-11-08 RX ADMIN — ACETAMINOPHEN 500 MG: 500 TABLET ORAL at 11:11

## 2023-11-08 RX ADMIN — THIAMINE HYDROCHLORIDE: 100 INJECTION, SOLUTION INTRAMUSCULAR; INTRAVENOUS at 04:11

## 2023-11-08 RX ADMIN — SODIUM CHLORIDE, POTASSIUM CHLORIDE, SODIUM LACTATE AND CALCIUM CHLORIDE 1000 ML: 600; 310; 30; 20 INJECTION, SOLUTION INTRAVENOUS at 10:11

## 2023-11-08 RX ADMIN — SODIUM CHLORIDE, POTASSIUM CHLORIDE, SODIUM LACTATE AND CALCIUM CHLORIDE 1000 ML: 600; 310; 30; 20 INJECTION, SOLUTION INTRAVENOUS at 04:11

## 2023-11-08 NOTE — Clinical Note
Diagnosis: Intentional overdose of trazodone [[id number]]   Future Attending Provider: JIM BLACKBURN [[id number]]   Admitting Provider:: JIM BLACKBURN [[id number]]

## 2023-11-08 NOTE — HPI
"Ms. Montalvo is a 27yo lady with a past medical history of alcohol abuse, fatty liver, kidney stones and PID.  She was recently seen in the ED at [facility] on 10/22/23 due to intoxication and wishes to stop drinking.  She was given a banana bag and discharged on Librium taper with detox information.  She tells me that she drinks about a pint of vodka daily, including last night.  She does not smoke cigarettes, but does, "smoke weed" on occasion.    She is very sedated and difficult to get a history from, but as best I can tell she, "hates birthdays" so got drunk tonight and began to facetime with her ex-boyfriend, Sameer.  She tells me that she, "got into a pissing contest.  Got mad, and just took a handful of pills."  She states the pills she took were Trazodone, and that she swallowed them between 11pm and midnight.  She is unsure of the dosage, as she got them from her mother's cabinet.  She also took an unknown amount of Librium.      Her boyfriend immediately called 911, then later, "the  just showed up at my house, and I sure didn't call them."  She tells me she was not intending to kill herself, but rather, "it was just a dumb dare."  Shortly after taking all the pills, she did have a few episodes of N/V/    In the ED her VS were BP (!) 89/49 -> 82/44 -> 98/52   Pulse 66 -> 86   Temp 97.7 °F (36.5 °C) (Oral)   Resp 14   LMP 10/01/2023   SpO2 (!) 96 -> 91% RA.  Labs showed , Cr 0.8, AST 96, ALT 50, ETOH 197.      CXR showed normal heart size, no infiltrate (personal read).  EKG showed sinus al with sinus arrhythmia, rate 56, no ST-T wave abnormalities, QTc 445 ms.    In the ED she was treated with:  Medications   thiamine (B-1) 100 mg/mL injection (has no administration in time range)   mvi, (ADULT) no.4 with vit K 3,300 unit- 150 mcg/10 mL injection Soln (has no administration in time range)   sodium chloride 0.9% bolus 1,000 mL 1,000 mL (0 mLs Intravenous Stopped 11/8/23 7926)   sodium " chloride 0.9% 1,000 mL with mvi, (ADULT) no.4 with vit K 3,300 unit- 150 mcg/10 mL 10 mL, thiamine 100 mg, folic acid 1 mg infusion ( Intravenous New Bag 11/8/23 0445)   sodium chloride 0.9% bolus 1,000 mL 1,000 mL (0 mLs Intravenous Stopped 11/8/23 0435)

## 2023-11-08 NOTE — ED NOTES
Called [facility] to call-in for pt per pt request. Spoke to Joey in security. Pt's call out ID is [id number].

## 2023-11-08 NOTE — CONSULTS
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 would benefit from a washout period given overdose) (discussed risks/benefits/alt vs no treatment with patient).    - Defer non-psychiatric medications / management to the Primary Medicine Team.       - Psychotherapy was performed with patient as noted above with a focus on improving depression with SI, anxiety, sleep, and polysubstance cessation / total sobriety.      - Patient's most recent resulted labs, imaging, and EKG were reviewed today ; UDS positive for THC and benzodiazepines at presentation to ED ; Ethanol was elevated at 197 at presentation to ED ; TSH wnl      - Continue suicide / violence / withdrawal (with CIWA-Ar protocol) precautions and continue to monitor the patient with a sitter while seeking inpatient psychiatric admission.       - Thank you for this consult         Total time spent with patient face-to-face and/or managing/coordinating patient's care today (excluding the time spent on psychotherapy): 76 minutes   Time spent on psychotherapy today (as noted above): 20 minutes   Total time for encounter today including psychotherapy: 96 minutes      More than 50% of the time was spent counseling/coordinating care.     Consulting clinician was informed of the encounter and consult note.     Consultation ended: 11/8/2023 at 12:35  PM       STAFF:  Atul Pitno MD  [facility]   11/8/2023

## 2023-11-08 NOTE — ED PROVIDER NOTES
Encounter Date: 11/8/2023       History     Chief Complaint   Patient presents with    Ingestion     Per pt she Intentionally ingested approx 15 trazadone about 2 hours ago. Pt drowsy on arrival to bed 2. Denies SI/HI.     Pt with ETOHism here via EMS after taking unknown number of trazodone and librium 2hr pta. Pt says she only took about 15 trazodone (50mg?) and was not trying to kill herself. She says her bf dared her to take them so she took them. She denies taking librium other than as directed. She admits to drinking ETOH as well. She has no pain. Emesis x1 pta. Denies being pregnant. No plan of suicide. Per EMS, pt's boyfriend said pt was trying to kill herself by taking the pills.     The history is provided by the patient and the EMS personnel.   Mental Health Problem  The primary symptoms include depressed mood, dysphoric mood and suicide attempt. The primary symptoms do not include aggression, agitation, bizarre behavior, delusions, disorganized speech, disorganized thinking, hallucinations, homicidal ideas, negative symptoms, paranoia, self-injury, somatic symptoms, suicidal ideas or suicidal threats. The current episode started 2 to 3 hours ago. This is a recurrent problem.   The onset of the illness is precipitated by emotional stress. Additional symptoms of the illness do not include anhedonia or agitation. She does not admit to suicidal ideas. She does not have a plan to attempt suicide. She does not contemplate harming herself. She has not already injured self. She does not contemplate injuring another person. She has not already  injured another person. Risk factors that are present for mental illness include substance abuse.     Review of patient's allergies indicates:  No Known Allergies  Past Medical History:   Diagnosis Date    EtOH dependence      No past surgical history on file.  No family history on file.  Social History     Tobacco Use    Smoking status: Never    Smokeless tobacco: Never    Substance Use Topics    Alcohol use: Yes     Comment: One pint of vodka daily    Drug use: Yes     Types: Marijuana     Review of Systems   Gastrointestinal:  Positive for nausea and vomiting.   Psychiatric/Behavioral:  Positive for dysphoric mood. Negative for agitation, hallucinations, homicidal ideas, paranoia, self-injury and suicidal ideas.    All other systems reviewed and are negative.      Physical Exam     Initial Vitals [11/08/23 0327]   BP Pulse Resp Temp SpO2   (!) 89/49 66 14 97.7 °F (36.5 °C) 96 %      MAP       --         Physical Exam    Nursing note and vitals reviewed.  Constitutional: She appears well-developed and well-nourished. She is not diaphoretic. No distress.   Somnolent but arousable.    HENT:   Head: Normocephalic and atraumatic.   Mouth/Throat: Oropharynx is clear and moist.   Eyes: Conjunctivae and EOM are normal. Pupils are equal, round, and reactive to light. No scleral icterus.   Neck: Neck supple. No thyromegaly present.   Normal range of motion.  Cardiovascular:  Normal rate, regular rhythm, normal heart sounds and intact distal pulses.           Pulmonary/Chest: Breath sounds normal.   Abdominal: Abdomen is soft. She exhibits no distension. There is no abdominal tenderness.   Musculoskeletal:         General: No tenderness or edema. Normal range of motion.      Cervical back: Normal range of motion and neck supple.     Neurological: She is oriented to person, place, and time. GCS eye subscore is 4. GCS verbal subscore is 5. GCS motor subscore is 6.   Somnolent, MAEW, GCS 3 5 6   Skin: Skin is warm and dry. Capillary refill takes less than 2 seconds. No rash noted. No erythema. No pallor.   Psychiatric: She has a normal mood and affect.         ED Course   Procedures  Labs Reviewed   CBC W/ AUTO DIFFERENTIAL - Abnormal; Notable for the following components:       Result Value    RBC 3.81 (*)      (*)     MCH 34.6 (*)     Immature Granulocytes 0.7 (*)     Basophil % 2.1  (*)     All other components within normal limits   COMPREHENSIVE METABOLIC PANEL - Abnormal; Notable for the following components:    CO2 22 (*)     AST 96 (*)     ALT 50 (*)     All other components within normal limits   ALCOHOL,MEDICAL (ETHANOL) - Abnormal; Notable for the following components:    Alcohol, Serum 197 (*)     All other components within normal limits   ACETAMINOPHEN LEVEL - Abnormal; Notable for the following components:    Acetaminophen (Tylenol), Serum <3.0 (*)     All other components within normal limits   SALICYLATE LEVEL - Abnormal; Notable for the following components:    Salicylate Lvl <5.0 (*)     All other components within normal limits   TSH   SARS-COV-2 RNA AMPLIFICATION, QUAL    Narrative:     Is the patient symptomatic?->No   URINALYSIS, REFLEX TO URINE CULTURE   DRUG SCREEN PANEL, URINE EMERGENCY   PREGNANCY TEST, URINE RAPID     EKG Readings: (Independently Interpreted)   Rhythm: Sinus Bradycardia. Heart Rate: 56. Ectopy: No Ectopy. Conduction: Normal. ST Segments: Normal ST Segments. T Waves: Normal. Axis: Normal. Clinical Impression: Sinus Bradycardia       Imaging Results              X-Ray Chest AP Portable (In process)                      Medications   sodium chloride 0.9% bolus 1,000 mL 1,000 mL (1,000 mLs Intravenous New Bag 11/8/23 0352)   thiamine (B-1) 100 mg/mL injection (has no administration in time range)   mvi, (ADULT) no.4 with vit K 3,300 unit- 150 mcg/10 mL injection Soln (has no administration in time range)   sodium chloride 0.9% bolus 1,000 mL 1,000 mL (0 mLs Intravenous Stopped 11/8/23 0346)   sodium chloride 0.9% 1,000 mL with mvi, (ADULT) no.4 with vit K 3,300 unit- 150 mcg/10 mL 10 mL, thiamine 100 mg, folic acid 1 mg infusion ( Intravenous New Bag 11/8/23 9661)     Medical Decision Making  Pt presented after apparent suicide attempt with 15 trazodone 50mg. Pt has ETOHism and her YAYA was 197. EKG showed sinus al but 70s on monitor in the ED. BP  slightly low but MAP normal. She was given total of 2L NS then 200cc/hr banana bag. CBC, CMP, TSH acetaminophen, salicylate and covid unremarkable. UA, UPT and UDS pending. Neg CXR. Pt will need obs for 8hrs to metabolize her trazodone and ETOH and then tele psych consult. D/w Dr Chavarria.      Amount and/or Complexity of Data Reviewed  Labs: ordered. Decision-making details documented in ED Course.  Radiology: ordered and independent interpretation performed. Decision-making details documented in ED Course.  ECG/medicine tests: ordered and independent interpretation performed. Decision-making details documented in ED Course.    Critical Care  Total time providing critical care: 35 minutes               ED Course as of 11/08/23 0434   Wed Nov 08, 2023   0422 CXR nap my read [DC]   0425 Pt will need 8hr obs for the trazodone. She should metabolize her ETOH by then as well.  [DC]      ED Course User Index  [DC] Greg Landrum Jr., MD                    Clinical Impression:   Final diagnoses:  [Z00.8] Medical clearance for psychiatric admission  [T43.212A] Intentional overdose of trazodone  [F10.920] Alcoholic intoxication without complication (Primary)        ED Disposition Condition    Observation Stable                Greg Landrum Jr., MD  11/08/23 6330

## 2023-11-08 NOTE — ASSESSMENT & PLAN NOTE
"When pressed, she tells me that she took 10-15 pills, but of an unknown dosage, around midnight.  Her QTc is currently normal, but monitor on tele  MgSO4 1g iv to keep Mg > 2  D5NS with 20 meq KCl at 100 cc/hr to keep K >4  Tele-psych consult  Poison control notified and recs noted in the nurses notes.  She does have somnolence, hypotension and bradycardia, so she will need monitoring very closely   -Repeat EKG at 9am to be sure QTc is not prolonging  Move to ICU if hypotension worsens, bradycardia worsens or with QTc prolongation development  Sedation potentiated by taking Librium in face of acute ETOH intoxication as well  Neuro checks Q4, PEC'd in the ED, as unclear if this was a suicide attempt or not  Continuous O2 monitor, as dropped to 91% while sleeping    In the ED her VS were BP (!) 89/49 -> 82/44 -> 98/52   Pulse 66 -> 86   Resp 14   SpO2 (!) 96 -> 91% RA.    EKG showed sinus al with sinus arrhythmia, rate 56, no ST-T wave abnormalities, QTc 445 ms.      "A retrospective review done examining over 118,000 single-substance trazodone exposures reported to [place] poison centers revealed that only 12% of patients with reported outcomes had QTc prolongation, and only 7% of patients had hypotension.  Peak plasma concentrations of trazodone occur approximately one hour after oral administration when taken on an empty stomach or two hours after oral administration when taken with food. Plasma concentrations of trazodone decline in a biphasic manner. The half-life of trazodone in the initial phase is about three to six hours, and the half-life in the terminal phase is about five to nine hours. The therapeutic range for plasma trazodone concentrations and the relationship of plasma concentrations to clinical response and toxicity has not been established [6]. As there is no antidote available against trazodone, management is largely supportive. It is also important to maintain optimal electrolyte levels and " "contact the local department of toxicology to report an overdose and receive updated guidelines regarding management." - Trazodone Overdose Manifesting as Hypotension and QT Prolongation. C[employer]. 2023 Mar; 15(3): [id number].  "

## 2023-11-08 NOTE — ASSESSMENT & PLAN NOTE
She has been drinking at least a pint of vodka daily for many years  She tells me that she was clean for 1 year in there somewhere, but relapsed  Her ETOH level today was 197  Check UDS (hasn't made urine yet, so ordered in and out for this, UPT and UA  She got a banana bag in the ED on arrival  Neuro checks and CIWA q4 hours      dextrose 5 % and 0.9 % NaCl with KCl 20 mEq infusion, 100 cc/hr    folic acid tablet 1 mg, 1 mg, Oral, Daily    multivitamin tablet, 1 tablet, Oral, Daily    pantoprazole EC tablet 40 mg, 40 mg, Oral, Daily    thiamine tablet 100 mg, 100 mg, Oral, Daily

## 2023-11-08 NOTE — SUBJECTIVE & OBJECTIVE
Past Medical History:   Diagnosis Date    EtOH dependence     Fatty liver due to alcoholism     Kidney stone on left side     PID (pelvic inflammatory disease)        No past surgical history on file.    Review of patient's allergies indicates:  No Known Allergies    No current facility-administered medications on file prior to encounter.     Current Outpatient Medications on File Prior to Encounter   Medication Sig    chlordiazepoxide (LIBRIUM) 25 MG Cap Take 1 capsule (25 mg total) by mouth every 8 (eight) hours as needed.    tamsulosin (FLOMAX) 0.4 mg Cap Take 1 capsule (0.4 mg total) by mouth once daily. for 6 doses     Family History    None       Tobacco Use    Smoking status: Never    Smokeless tobacco: Never   Substance and Sexual Activity    Alcohol use: Yes     Comment: One pint of vodka daily    Drug use: Yes     Types: Marijuana    Sexual activity: Yes     Partners: Male     Review of Systems   Constitutional:  Positive for activity change, chills and fatigue.   HENT:  Negative for congestion.    Eyes:  Positive for visual disturbance.   Respiratory:  Negative for cough and shortness of breath.    Cardiovascular:  Negative for chest pain.   Gastrointestinal:  Positive for nausea and vomiting. Negative for diarrhea.   Endocrine: Positive for cold intolerance.   Genitourinary:  Negative for dysuria.   Musculoskeletal:  Negative for myalgias.   Skin:  Negative for rash.   Allergic/Immunologic: Negative for immunocompromised state.   Neurological:  Positive for dizziness and light-headedness.   Hematological:  Does not bruise/bleed easily.   Psychiatric/Behavioral:  Positive for behavioral problems, decreased concentration, dysphoric mood, self-injury and sleep disturbance.      Objective:     Vital Signs (Most Recent):  Temp: 97.7 °F (36.5 °C) (11/08/23 0327)  Pulse: 86 (11/08/23 0442)  Resp: 14 (11/08/23 0327)  BP: (!) 98/52 (11/08/23 0437)  SpO2: (!) 91 % (11/08/23 0442) Vital Signs (24h Range):  Temp:   [97.7 °F (36.5 °C)] 97.7 °F (36.5 °C)  Pulse:  [66-86] 86  Resp:  [14] 14  SpO2:  [91 %-96 %] 91 %  BP: (82-99)/(34-59) 98/52        There is no height or weight on file to calculate BMI.     Physical Exam  Constitutional:       General: She is sleeping. She is not in acute distress.     Appearance: She is normal weight. She is not ill-appearing, toxic-appearing or diaphoretic.   HENT:      Head: Normocephalic and atraumatic.   Eyes:      Conjunctiva/sclera:      Right eye: Right conjunctiva is injected.      Left eye: Left conjunctiva is injected.   Genitourinary:     Comments: No ramachandran in place  Skin:     Comments: Tattoo on back   Neurological:      Mental Status: She is oriented to person, place, and time. She is lethargic.      GCS: GCS eye subscore is 4. GCS verbal subscore is 5. GCS motor subscore is 6.   Psychiatric:         Attention and Perception: She is inattentive.         Mood and Affect: Mood is depressed. Affect is flat.         Speech: Speech is slurred.         Behavior: Behavior is slowed and withdrawn.         Cognition and Memory: Cognition is impaired. Memory is not impaired. She does not exhibit impaired recent memory or impaired remote memory.                Significant Labs: All pertinent labs within the past 24 hours have been reviewed.  Recent Results (from the past 24 hour(s))   CBC auto differential    Collection Time: 11/08/23  3:35 AM   Result Value Ref Range    WBC 4.30 3.90 - 12.70 K/uL    RBC 3.81 (L) 4.00 - 5.40 M/uL    Hemoglobin 13.2 12.0 - 16.0 g/dL    Hematocrit 38.5 37.0 - 48.5 %     (H) 82 - 98 fL    MCH 34.6 (H) 27.0 - 31.0 pg    MCHC 34.3 32.0 - 36.0 g/dL    RDW 11.5 11.5 - 14.5 %    Platelets 232 150 - 450 K/uL    MPV 10.4 9.2 - 12.9 fL    Immature Granulocytes 0.7 (H) 0.0 - 0.5 %    Gran # (ANC) 2.0 1.8 - 7.7 K/uL    Immature Grans (Abs) 0.03 0.00 - 0.04 K/uL    Lymph # 1.8 1.0 - 4.8 K/uL    Mono # 0.3 0.3 - 1.0 K/uL    Eos # 0.1 0.0 - 0.5 K/uL    Baso # 0.09 0.00  - 0.20 K/uL    nRBC 0 0 /100 WBC    Gran % 47.0 38.0 - 73.0 %    Lymph % 41.4 18.0 - 48.0 %    Mono % 7.2 4.0 - 15.0 %    Eosinophil % 1.6 0.0 - 8.0 %    Basophil % 2.1 (H) 0.0 - 1.9 %    Differential Method Automated    Comprehensive metabolic panel    Collection Time: 11/08/23  3:35 AM   Result Value Ref Range    Sodium 144 136 - 145 mmol/L    Potassium 4.0 3.5 - 5.1 mmol/L    Chloride 108 95 - 110 mmol/L    CO2 22 (L) 23 - 29 mmol/L    Glucose 81 70 - 110 mg/dL    BUN 8 6 - 20 mg/dL    Creatinine 0.8 0.5 - 1.4 mg/dL    Calcium 8.7 8.7 - 10.5 mg/dL    Total Protein 7.4 6.0 - 8.4 g/dL    Albumin 4.2 3.5 - 5.2 g/dL    Total Bilirubin 0.3 0.1 - 1.0 mg/dL    Alkaline Phosphatase 89 55 - 135 U/L    AST 96 (H) 10 - 40 U/L    ALT 50 (H) 10 - 44 U/L    eGFR >60.0 >60 mL/min/1.73 m^2    Anion Gap 14 8 - 16 mmol/L   TSH    Collection Time: 11/08/23  3:35 AM   Result Value Ref Range    TSH 2.310 0.400 - 4.000 uIU/mL   Ethanol    Collection Time: 11/08/23  3:35 AM   Result Value Ref Range    Alcohol, Serum 197 (H) 0 - 10 mg/dL   Acetaminophen level    Collection Time: 11/08/23  3:35 AM   Result Value Ref Range    Acetaminophen (Tylenol), Serum <3.0 (L) 10.0 - 20.0 ug/mL   Salicylate level    Collection Time: 11/08/23  3:35 AM   Result Value Ref Range    Salicylate Lvl <5.0 (L) 15.0 - 30.0 mg/dL   Magnesium    Collection Time: 11/08/23  3:35 AM   Result Value Ref Range    Magnesium 1.8 1.6 - 2.6 mg/dL   Phosphorus    Collection Time: 11/08/23  3:35 AM   Result Value Ref Range    Phosphorus 3.6 2.7 - 4.5 mg/dL   COVID-19 Rapid Screening    Collection Time: 11/08/23  3:40 AM   Result Value Ref Range    SARS-CoV-2 RNA, Amplification, Qual Negative Negative         Significant Imaging: I have reviewed all pertinent imaging results/findings within the past 24 hours.

## 2023-11-08 NOTE — H&P
"[facility]  [facility]  History & Physical    Patient Name: Sarah Montalvo  MRN: [id number]  Admission Date: 11/8/2023  Attending Physician: Lenny Vazquez MD   Primary Care Provider: Ana, Primary Doctor         Patient information was obtained from patient, past medical records and ER records.       Subjective:     Principal Problem:Intentional overdose of trazodone    Chief Complaint:   Chief Complaint   Patient presents with    Ingestion     Per pt she Intentionally ingested approx 15 trazadone about 2 hours ago. Pt drowsy on arrival to bed 2. Denies SI/HI.        HPI:   Ms. Montalvo is a 27yo lady with a past medical history of alcohol abuse, fatty liver, kidney stones and PID.  She was recently seen in the ED at [facility] on 10/22/23 due to intoxication and wishes to stop drinking.  She was given a banana bag and discharged on Librium taper with detox information.  She tells me that she drinks about a pint of vodka daily, including last night.  She does not smoke cigarettes, but does, "smoke weed" on occasion.    She is very sedated and difficult to get a history from, but as best I can tell she, "hates birthdays" so got drunk tonight and began to facetime with her ex-boyfriend, Sameer.  She tells me that she, "got into a pissing contest.  Got mad, and just took a handful of pills."  She states the pills she took were Trazodone, and that she swallowed them between 11pm and midnight.  She is unsure of the dosage, as she got them from her mother's cabinet.  She also took an unknown amount of Librium.      Her boyfriend immediately called 911, then later, "the  just showed up at my house, and I sure didn't call them."  She tells me she was not intending to kill herself, but rather, "it was just a dumb dare."  Shortly after taking all the pills, she did have a few episodes of N/V/    In the ED her VS were BP (!) 89/49 -> 82/44 -> 98/52   Pulse 66 -> 86   Temp 97.7 °F (36.5 °C) (Oral)   " Resp 14   LMP 10/01/2023   SpO2 (!) 96 -> 91% RA.  Labs showed , Cr 0.8, AST 96, ALT 50, ETOH 197.      CXR showed normal heart size, no infiltrate (personal read).  EKG showed sinus al with sinus arrhythmia, rate 56, no ST-T wave abnormalities, QTc 445 ms.    In the ED she was treated with:  Medications   thiamine (B-1) 100 mg/mL injection (has no administration in time range)   mvi, (ADULT) no.4 with vit K 3,300 unit- 150 mcg/10 mL injection Soln (has no administration in time range)   sodium chloride 0.9% bolus 1,000 mL 1,000 mL (0 mLs Intravenous Stopped 11/8/23 0346)   sodium chloride 0.9% 1,000 mL with mvi, (ADULT) no.4 with vit K 3,300 unit- 150 mcg/10 mL 10 mL, thiamine 100 mg, folic acid 1 mg infusion ( Intravenous New Bag 11/8/23 0445)   sodium chloride 0.9% bolus 1,000 mL 1,000 mL (0 mLs Intravenous Stopped 11/8/23 0435)             Past Medical History:   Diagnosis Date    EtOH dependence     Fatty liver due to alcoholism     Kidney stone on left side     PID (pelvic inflammatory disease)        No past surgical history on file.    Review of patient's allergies indicates:  No Known Allergies    No current facility-administered medications on file prior to encounter.     Current Outpatient Medications on File Prior to Encounter   Medication Sig    chlordiazepoxide (LIBRIUM) 25 MG Cap Take 1 capsule (25 mg total) by mouth every 8 (eight) hours as needed.    tamsulosin (FLOMAX) 0.4 mg Cap Take 1 capsule (0.4 mg total) by mouth once daily. for 6 doses     Family History    None       Tobacco Use    Smoking status: Never    Smokeless tobacco: Never   Substance and Sexual Activity    Alcohol use: Yes     Comment: One pint of vodka daily    Drug use: Yes     Types: Marijuana    Sexual activity: Yes     Partners: Male     Review of Systems   Constitutional:  Positive for activity change, chills and fatigue.   HENT:  Negative for congestion.    Eyes:  Positive for visual disturbance.    Respiratory:  Negative for cough and shortness of breath.    Cardiovascular:  Negative for chest pain.   Gastrointestinal:  Positive for nausea and vomiting. Negative for diarrhea.   Endocrine: Positive for cold intolerance.   Genitourinary:  Negative for dysuria.   Musculoskeletal:  Negative for myalgias.   Skin:  Negative for rash.   Allergic/Immunologic: Negative for immunocompromised state.   Neurological:  Positive for dizziness and light-headedness.   Hematological:  Does not bruise/bleed easily.   Psychiatric/Behavioral:  Positive for behavioral problems, decreased concentration, dysphoric mood, self-injury and sleep disturbance.      Objective:     Vital Signs (Most Recent):  Temp: 97.7 °F (36.5 °C) (11/08/23 0327)  Pulse: 86 (11/08/23 0442)  Resp: 14 (11/08/23 0327)  BP: (!) 98/52 (11/08/23 0437)  SpO2: (!) 91 % (11/08/23 0442) Vital Signs (24h Range):  Temp:  [97.7 °F (36.5 °C)] 97.7 °F (36.5 °C)  Pulse:  [66-86] 86  Resp:  [14] 14  SpO2:  [91 %-96 %] 91 %  BP: (82-99)/(34-59) 98/52        There is no height or weight on file to calculate BMI.     Physical Exam  Constitutional:       General: She is sleeping. She is not in acute distress.     Appearance: She is normal weight. She is not ill-appearing, toxic-appearing or diaphoretic.   HENT:      Head: Normocephalic and atraumatic.   Eyes:      Conjunctiva/sclera:      Right eye: Right conjunctiva is injected.      Left eye: Left conjunctiva is injected.   Genitourinary:     Comments: No ramachandran in place  Skin:     Comments: Tattoo on back   Neurological:      Mental Status: She is oriented to person, place, and time. She is lethargic.      GCS: GCS eye subscore is 4. GCS verbal subscore is 5. GCS motor subscore is 6.   Psychiatric:         Attention and Perception: She is inattentive.         Mood and Affect: Mood is depressed. Affect is flat.         Speech: Speech is slurred.         Behavior: Behavior is slowed and withdrawn.         Cognition and  Memory: Cognition is impaired. Memory is not impaired. She does not exhibit impaired recent memory or impaired remote memory.                Significant Labs: All pertinent labs within the past 24 hours have been reviewed.  Recent Results (from the past 24 hour(s))   CBC auto differential    Collection Time: 11/08/23  3:35 AM   Result Value Ref Range    WBC 4.30 3.90 - 12.70 K/uL    RBC 3.81 (L) 4.00 - 5.40 M/uL    Hemoglobin 13.2 12.0 - 16.0 g/dL    Hematocrit 38.5 37.0 - 48.5 %     (H) 82 - 98 fL    MCH 34.6 (H) 27.0 - 31.0 pg    MCHC 34.3 32.0 - 36.0 g/dL    RDW 11.5 11.5 - 14.5 %    Platelets 232 150 - 450 K/uL    MPV 10.4 9.2 - 12.9 fL    Immature Granulocytes 0.7 (H) 0.0 - 0.5 %    Gran # (ANC) 2.0 1.8 - 7.7 K/uL    Immature Grans (Abs) 0.03 0.00 - 0.04 K/uL    Lymph # 1.8 1.0 - 4.8 K/uL    Mono # 0.3 0.3 - 1.0 K/uL    Eos # 0.1 0.0 - 0.5 K/uL    Baso # 0.09 0.00 - 0.20 K/uL    nRBC 0 0 /100 WBC    Gran % 47.0 38.0 - 73.0 %    Lymph % 41.4 18.0 - 48.0 %    Mono % 7.2 4.0 - 15.0 %    Eosinophil % 1.6 0.0 - 8.0 %    Basophil % 2.1 (H) 0.0 - 1.9 %    Differential Method Automated    Comprehensive metabolic panel    Collection Time: 11/08/23  3:35 AM   Result Value Ref Range    Sodium 144 136 - 145 mmol/L    Potassium 4.0 3.5 - 5.1 mmol/L    Chloride 108 95 - 110 mmol/L    CO2 22 (L) 23 - 29 mmol/L    Glucose 81 70 - 110 mg/dL    BUN 8 6 - 20 mg/dL    Creatinine 0.8 0.5 - 1.4 mg/dL    Calcium 8.7 8.7 - 10.5 mg/dL    Total Protein 7.4 6.0 - 8.4 g/dL    Albumin 4.2 3.5 - 5.2 g/dL    Total Bilirubin 0.3 0.1 - 1.0 mg/dL    Alkaline Phosphatase 89 55 - 135 U/L    AST 96 (H) 10 - 40 U/L    ALT 50 (H) 10 - 44 U/L    eGFR >60.0 >60 mL/min/1.73 m^2    Anion Gap 14 8 - 16 mmol/L   TSH    Collection Time: 11/08/23  3:35 AM   Result Value Ref Range    TSH 2.310 0.400 - 4.000 uIU/mL   Ethanol    Collection Time: 11/08/23  3:35 AM   Result Value Ref Range    Alcohol, Serum 197 (H) 0 - 10 mg/dL   Acetaminophen level     "Collection Time: 11/08/23  3:35 AM   Result Value Ref Range    Acetaminophen (Tylenol), Serum <3.0 (L) 10.0 - 20.0 ug/mL   Salicylate level    Collection Time: 11/08/23  3:35 AM   Result Value Ref Range    Salicylate Lvl <5.0 (L) 15.0 - 30.0 mg/dL   Magnesium    Collection Time: 11/08/23  3:35 AM   Result Value Ref Range    Magnesium 1.8 1.6 - 2.6 mg/dL   Phosphorus    Collection Time: 11/08/23  3:35 AM   Result Value Ref Range    Phosphorus 3.6 2.7 - 4.5 mg/dL   COVID-19 Rapid Screening    Collection Time: 11/08/23  3:40 AM   Result Value Ref Range    SARS-CoV-2 RNA, Amplification, Qual Negative Negative         Significant Imaging: I have reviewed all pertinent imaging results/findings within the past 24 hours.    Assessment/Plan:     * Intentional overdose of trazodone  When pressed, she tells me that she took 10-15 pills, but of an unknown dosage, around midnight.  Her QTc is currently normal, but monitor on tele  MgSO4 1g iv to keep Mg > 2  D5NS with 20 meq KCl at 100 cc/hr to keep K >4  Tele-psych consult  Poison control notified and recs noted in the nurses notes.  She does have somnolence, hypotension and bradycardia, so she will need monitoring very closely   -Repeat EKG at 9am to be sure QTc is not prolonging  Move to ICU if hypotension worsens, bradycardia worsens or with QTc prolongation development  Sedation potentiated by taking Librium in face of acute ETOH intoxication as well  Neuro checks Q4, PEC'd in the ED, as unclear if this was a suicide attempt or not  Continuous O2 monitor, as dropped to 91% while sleeping    In the ED her VS were BP (!) 89/49 -> 82/44 -> 98/52   Pulse 66 -> 86   Resp 14   SpO2 (!) 96 -> 91% RA.    EKG showed sinus al with sinus arrhythmia, rate 56, no ST-T wave abnormalities, QTc 445 ms.      "A retrospective review done examining over 118,000 single-substance trazodone exposures reported to [place] poison centers revealed that only 12% of patients with reported outcomes " "had QTc prolongation, and only 7% of patients had hypotension.  Peak plasma concentrations of trazodone occur approximately one hour after oral administration when taken on an empty stomach or two hours after oral administration when taken with food. Plasma concentrations of trazodone decline in a biphasic manner. The half-life of trazodone in the initial phase is about three to six hours, and the half-life in the terminal phase is about five to nine hours. The therapeutic range for plasma trazodone concentrations and the relationship of plasma concentrations to clinical response and toxicity has not been established [6]. As there is no antidote available against trazodone, management is largely supportive. It is also important to maintain optimal electrolyte levels and contact the local department of toxicology to report an overdose and receive updated guidelines regarding management." - Trazodone Overdose Manifesting as Hypotension and QT Prolongation. Viet. 2023 Mar; 15(3): [id number].    Alcohol abuse  She has been drinking at least a pint of vodka daily for many years  She tells me that she was clean for 1 year in there somewhere, but relapsed  Her ETOH level today was 197  Check UDS (hasn't made urine yet, so ordered in and out for this, UPT and UA  She got a banana bag in the ED on arrival  Neuro checks and CIWA q4 hours      dextrose 5 % and 0.9 % NaCl with KCl 20 mEq infusion, 100 cc/hr    folic acid tablet 1 mg, 1 mg, Oral, Daily    multivitamin tablet, 1 tablet, Oral, Daily    pantoprazole EC tablet 40 mg, 40 mg, Oral, Daily    thiamine tablet 100 mg, 100 mg, Oral, Daily    Fatty liver due to alcoholism  I counseled her for 5 minutes on the pathophysiology of alcohol causing fatty liver, then cirrhosis, then miserable death with ascites and encephalopathy.  She seemed stunned by these risks, so hopefully with help she will quit.        Marijuana abuse  She tells me she, "smokes weed on occasion"  UDS " ordered        VTE Risk Mitigation (From admission, onward)         Ordered     IP VTE LOW RISK PATIENT  Once         11/08/23 0535     Place sequential compression device  Until discontinued         11/08/23 0535     Place WILLEM hose  Until discontinued         11/08/23 0535                     The attending portion of this evaluation, treatment, and documentation was performed per LORRAINE Chavarria MD via Telemedicine AudioVisual using the secure Pazien software platform with 2 way audio/video. The provider was located off-site and the patient is located in the hospital. The aforementioned video software was utilized to document the relevant history and physical exam    On 11/08/2023, patient should be placed in hospital observation services under my care.           LORRAINE Chavarria MD  Department of Hospital Medicine   [facility] - Emergency Dept

## 2023-11-08 NOTE — ED NOTES
[place] poison control called.  Spoke with ENMANUEL Paz.  Case Number #[id number].  Recommend watching for CNS depression, Respiratory depression, hypotension, bradycardia, seizures, and QT prolongation. Recommend atropine if needed for bradycardia and benzos for seizure activity.  Supportive care.  Observation for 8hrs.  0930 time to reevaluate for dispo. Call back if salicylate or acetaminophen results return elevated.

## 2023-11-08 NOTE — LETTER
November 15, 2023         [address] [facility] [address]  Phone: [phone]  Fax: [phone]       Patient: Sarah Montalvo   YOB: 1995  Date of Visit: 11/15/2023    To Whom It May Concern:    Zina Montalvo  was at [facility] from 11/8/23-11/10/23. The patient may return to work/school. If you have any questions or concerns, or if I can be of further assistance, please do not hesitate to contact me.    Sincerely,    Lenny Vazquez MD

## 2023-11-08 NOTE — PLAN OF CARE
[place] - Intensive Care  Initial Discharge Assessment       Primary Care Provider: Ana, Primary Doctor    Admission Diagnosis: Chest pain [R07.9]  Medical clearance for psychiatric admission [Z00.8]  Alcoholic intoxication without complication [F10.920]  Intentional overdose of trazodone [T43.212A]    Admission Date: 11/8/2023  Expected Discharge Date:     Patient alert & oriented tearful at times during the assessment. She lives with her brother & her 9yr old son stays with her at times. She has a boyfriend of 3-4yr that lives in [place]. She has worked at [employer] since May 2023 as  & doesn't want to lose her job. Nurse did notify them that she was in the hospital. She doesn't work again until Sat. She was in [facility] from May 2022 until Jan 2023. She drinks a pint of vodka/day. She doesn't want to go to inpatient psych as she was away from her son for too long & doesn't want to leave him again. Explained to her that our psychiatrist will evaluate her daily & if their recommendation is inpatient psych that is what our doctor will do. She is open to going to AA meetings. She states taking the medications was her being impulsive & didn't want to kill herself. Will continue to follow.    Transition of Care Barriers: Mental illness, Substance Abuse    Payor: MEDICAID / Plan: HEALTHY BLUE [place] / Product Type: Managed Medicaid /     Extended Emergency Contact Information  Primary Emergency Contact: ARNOLDO FOWLER  Mobile Phone: [phone]  Relation: Brother  Preferred language: English   needed? No    Discharge Plan A: Psychiatric hospital  Discharge Plan B: Home with family      [id number] [place] - [address]  [address]  [place] [address]  Phone: [phone] Fax: [phone]      Initial Assessment (most recent)       Adult Discharge Assessment - 11/08/23 1600          Discharge Assessment     Assessment Type Discharge Planning Assessment     Confirmed/corrected address, phone number and insurance Yes     Confirmed Demographics Correct on Facesheet     Source of Information patient     When was your last doctors appointment? --   last year    Does patient/caregiver understand observation status Yes     Communicated HEAVENLY with patient/caregiver Date not available/Unable to determine     Reason For Admission overdose     People in Home sibling(s);child(sheri), dependent     Do you expect to return to your current living situation? Yes     Do you have help at home or someone to help you manage your care at home? Yes     Who are your caregiver(s) and their phone number(s)? Brian houser [phone]     Prior to hospitilization cognitive status: Alert/Oriented     Current cognitive status: Alert/Oriented     Home Accessibility stairs to enter home     Number of Stairs, Main Entrance other (see comments)   12    Home Layout Able to live on 1st floor     Equipment Currently Used at Home none     Readmission within 30 days? No     Patient currently being followed by outpatient case management? No     Do you currently have service(s) that help you manage your care at home? No     Do you take prescription medications? No     Do you have prescription coverage? Yes     Coverage [place] Medicaid     Do you have any problems affording any of your prescribed medications? No     Is the patient taking medications as prescribed? --   NA    Who is going to help you get home at discharge? Brian Garcia brother [phone]     How do you get to doctors appointments? car, drives self     Are you on dialysis? No     Do you take coumadin? No     DME Needed Upon Discharge  none     Discharge Plan discussed with: Patient     Transition of Care Barriers Mental illness;Substance Abuse     Discharge Plan A Psychiatric hospital     Discharge Plan B Home with family        Physical Activity    On average, how many days per week do  you engage in moderate to strenuous exercise (like a brisk walk)? 5 days     On average, how many minutes do you engage in exercise at this level? 150+ min        Financial Resource Strain    How hard is it for you to pay for the very basics like food, housing, medical care, and heating? Not hard at all        Housing Stability    In the last 12 months, was there a time when you were not able to pay the mortgage or rent on time? No     In the last 12 months, how many places have you lived? 2     In the last 12 months, was there a time when you did not have a steady place to sleep or slept in a shelter (including now)? No        Transportation Needs    In the past 12 months, has lack of transportation kept you from medical appointments or from getting medications? No     In the past 12 months, has lack of transportation kept you from meetings, work, or from getting things needed for daily living? No        Food Insecurity    Within the past 12 months, you worried that your food would run out before you got the money to buy more. Never true     Within the past 12 months, the food you bought just didn't last and you didn't have money to get more. Never true        Stress    Do you feel stress - tense, restless, nervous, or anxious, or unable to sleep at night because your mind is troubled all the time - these days? Very much        Social Connections    In a typical week, how many times do you talk on the phone with family, friends, or neighbors? More than three times a week     How often do you get together with friends or relatives? Once a week     How often do you attend Anabaptism or Temple services? More than 4 times per year     Do you belong to any clubs or organizations such as Anabaptism groups, unions, fraternal or athletic groups, or school groups? No     Are you , , , , never , or living with a partner? Never         Alcohol Use    Q1: How often do you have a drink  containing alcohol? 4 or more times a week     Q2: How many drinks containing alcohol do you have on a typical day when you are drinking? 5 or 6   pint/day vodka    Q3: How often do you have six or more drinks on one occasion? Daily or almost daily        OTHER    Name(s) of People in Home Brian Garcia brother [phone]

## 2023-11-08 NOTE — Clinical Note
"Sarah Chao" Montalvo was seen and treated in our emergency department on 11/8/2023.  She may return to work on 11/08/2023.  Pt transferred. Was seen and treated at [facility] 11/8     If you have any questions or concerns, please don't hesitate to call.       RN    "

## 2023-11-08 NOTE — NURSING
Pt arrived on floor via WC. No ramachandran in place. IV saline locked. VSS and afebrile. Sitter in place. Pt oriented to new environment. Plan of care reviewed with patient. All patient needs met and anticipated

## 2023-11-08 NOTE — ASSESSMENT & PLAN NOTE
I counseled her for 5 minutes on the pathophysiology of alcohol causing fatty liver, then cirrhosis, then miserable death with ascites and encephalopathy.  She seemed stunned by these risks, so hopefully with help she will quit.

## 2023-11-09 LAB
ALBUMIN SERPL BCP-MCNC: 3 G/DL (ref 3.5–5.2)
ALP SERPL-CCNC: 68 U/L (ref 55–135)
ALT SERPL W/O P-5'-P-CCNC: 31 U/L (ref 10–44)
ANION GAP SERPL CALC-SCNC: 7 MMOL/L (ref 8–16)
AST SERPL-CCNC: 41 U/L (ref 10–40)
BASOPHILS # BLD AUTO: 0.05 K/UL (ref 0–0.2)
BASOPHILS NFR BLD: 0.8 % (ref 0–1.9)
BILIRUB SERPL-MCNC: 0.5 MG/DL (ref 0.1–1)
BUN SERPL-MCNC: 2 MG/DL (ref 6–20)
CALCIUM SERPL-MCNC: 7.5 MG/DL (ref 8.7–10.5)
CHLORIDE SERPL-SCNC: 109 MMOL/L (ref 95–110)
CO2 SERPL-SCNC: 21 MMOL/L (ref 23–29)
CREAT SERPL-MCNC: 0.6 MG/DL (ref 0.5–1.4)
DIFFERENTIAL METHOD: ABNORMAL
EOSINOPHIL # BLD AUTO: 0.1 K/UL (ref 0–0.5)
EOSINOPHIL NFR BLD: 1.6 % (ref 0–8)
ERYTHROCYTE [DISTWIDTH] IN BLOOD BY AUTOMATED COUNT: 11.4 % (ref 11.5–14.5)
EST. GFR  (NO RACE VARIABLE): >60 ML/MIN/1.73 M^2
ETHANOL SERPL-MCNC: <10 MG/DL (ref 0–10)
GLUCOSE SERPL-MCNC: 95 MG/DL (ref 70–110)
HCT VFR BLD AUTO: 33.2 % (ref 37–48.5)
HGB BLD-MCNC: 11.1 G/DL (ref 12–16)
IMM GRANULOCYTES # BLD AUTO: 0.02 K/UL (ref 0–0.04)
IMM GRANULOCYTES NFR BLD AUTO: 0.3 % (ref 0–0.5)
LYMPHOCYTES # BLD AUTO: 1.6 K/UL (ref 1–4.8)
LYMPHOCYTES NFR BLD: 25.5 % (ref 18–48)
MAGNESIUM SERPL-MCNC: 1.7 MG/DL (ref 1.6–2.6)
MCH RBC QN AUTO: 34.3 PG (ref 27–31)
MCHC RBC AUTO-ENTMCNC: 33.4 G/DL (ref 32–36)
MCV RBC AUTO: 103 FL (ref 82–98)
MONOCYTES # BLD AUTO: 0.6 K/UL (ref 0.3–1)
MONOCYTES NFR BLD: 9.6 % (ref 4–15)
NEUTROPHILS # BLD AUTO: 3.8 K/UL (ref 1.8–7.7)
NEUTROPHILS NFR BLD: 62.2 % (ref 38–73)
NRBC BLD-RTO: 0 /100 WBC
PHOSPHATE SERPL-MCNC: 2 MG/DL (ref 2.7–4.5)
PLATELET # BLD AUTO: 214 K/UL (ref 150–450)
PMV BLD AUTO: 10.5 FL (ref 9.2–12.9)
POTASSIUM SERPL-SCNC: 3.6 MMOL/L (ref 3.5–5.1)
PROT SERPL-MCNC: 5.3 G/DL (ref 6–8.4)
RBC # BLD AUTO: 3.24 M/UL (ref 4–5.4)
SODIUM SERPL-SCNC: 137 MMOL/L (ref 136–145)
WBC # BLD AUTO: 6.12 K/UL (ref 3.9–12.7)

## 2023-11-09 PROCEDURE — 84100 ASSAY OF PHOSPHORUS: CPT | Performed by: INTERNAL MEDICINE

## 2023-11-09 PROCEDURE — 94761 N-INVAS EAR/PLS OXIMETRY MLT: CPT

## 2023-11-09 PROCEDURE — G0378 HOSPITAL OBSERVATION PER HR: HCPCS

## 2023-11-09 PROCEDURE — 96361 HYDRATE IV INFUSION ADD-ON: CPT

## 2023-11-09 PROCEDURE — 96360 HYDRATION IV INFUSION INIT: CPT | Mod: 59

## 2023-11-09 PROCEDURE — 83735 ASSAY OF MAGNESIUM: CPT | Performed by: INTERNAL MEDICINE

## 2023-11-09 PROCEDURE — 36415 COLL VENOUS BLD VENIPUNCTURE: CPT | Performed by: INTERNAL MEDICINE

## 2023-11-09 PROCEDURE — 99233 PR SUBSEQUENT HOSPITAL CARE,LEVL III: ICD-10-PCS | Mod: ,,, | Performed by: STUDENT IN AN ORGANIZED HEALTH CARE EDUCATION/TRAINING PROGRAM

## 2023-11-09 PROCEDURE — 36415 COLL VENOUS BLD VENIPUNCTURE: CPT | Performed by: STUDENT IN AN ORGANIZED HEALTH CARE EDUCATION/TRAINING PROGRAM

## 2023-11-09 PROCEDURE — 85025 COMPLETE CBC W/AUTO DIFF WBC: CPT | Performed by: INTERNAL MEDICINE

## 2023-11-09 PROCEDURE — 99215 OFFICE O/P EST HI 40 MIN: CPT | Mod: AF,HB,95, | Performed by: PSYCHIATRY & NEUROLOGY

## 2023-11-09 PROCEDURE — 25000003 PHARM REV CODE 250: Performed by: STUDENT IN AN ORGANIZED HEALTH CARE EDUCATION/TRAINING PROGRAM

## 2023-11-09 PROCEDURE — 25000003 PHARM REV CODE 250: Performed by: INTERNAL MEDICINE

## 2023-11-09 PROCEDURE — 99215 PR OFFICE/OUTPT VISIT, EST, LEVL V, 40-54 MIN: ICD-10-PCS | Mod: AF,HB,95, | Performed by: PSYCHIATRY & NEUROLOGY

## 2023-11-09 PROCEDURE — 80053 COMPREHEN METABOLIC PANEL: CPT | Performed by: INTERNAL MEDICINE

## 2023-11-09 PROCEDURE — 82077 ASSAY SPEC XCP UR&BREATH IA: CPT | Performed by: STUDENT IN AN ORGANIZED HEALTH CARE EDUCATION/TRAINING PROGRAM

## 2023-11-09 PROCEDURE — 99233 SBSQ HOSP IP/OBS HIGH 50: CPT | Mod: ,,, | Performed by: STUDENT IN AN ORGANIZED HEALTH CARE EDUCATION/TRAINING PROGRAM

## 2023-11-09 RX ORDER — SODIUM,POTASSIUM PHOSPHATES 280-250MG
2 POWDER IN PACKET (EA) ORAL ONCE
Status: COMPLETED | OUTPATIENT
Start: 2023-11-09 | End: 2023-11-09

## 2023-11-09 RX ORDER — FLUOXETINE 10 MG/1
20 CAPSULE ORAL DAILY
Status: DISCONTINUED | OUTPATIENT
Start: 2023-11-09 | End: 2023-11-10 | Stop reason: HOSPADM

## 2023-11-09 RX ADMIN — PANTOPRAZOLE SODIUM 40 MG: 40 TABLET, DELAYED RELEASE ORAL at 09:11

## 2023-11-09 RX ADMIN — ALUMINUM HYDROXIDE, MAGNESIUM HYDROXIDE, AND SIMETHICONE 30 ML: 200; 200; 20 SUSPENSION ORAL at 12:11

## 2023-11-09 RX ADMIN — THERA TABS 1 TABLET: TAB at 09:11

## 2023-11-09 RX ADMIN — POTASSIUM & SODIUM PHOSPHATES POWDER PACK 280-160-250 MG 2 PACKET: 280-160-250 PACK at 09:11

## 2023-11-09 RX ADMIN — FLUOXETINE 20 MG: 10 CAPSULE ORAL at 01:11

## 2023-11-09 RX ADMIN — THIAMINE HCL TAB 100 MG 100 MG: 100 TAB at 09:11

## 2023-11-09 RX ADMIN — FOLIC ACID 1 MG: 1 TABLET ORAL at 09:11

## 2023-11-09 RX ADMIN — ALUMINUM HYDROXIDE, MAGNESIUM HYDROXIDE, AND SIMETHICONE 30 ML: 200; 200; 20 SUSPENSION ORAL at 10:11

## 2023-11-09 RX ADMIN — POTASSIUM CHLORIDE, DEXTROSE MONOHYDRATE AND SODIUM CHLORIDE: 150; 5; 900 INJECTION, SOLUTION INTRAVENOUS at 04:11

## 2023-11-09 NOTE — PLAN OF CARE
[facility] - Intensive Care  Discharge Final Note    Primary Care Provider: No, Primary Doctor    Expected Discharge Date: 11/9/2023  Per notes approved for psych admission to [facility]. Nurse notified boyfriend & patient's brother. No other needs at this time.     Final Discharge Note (most recent)       Final Note - 11/09/23 1736          Final Note    Assessment Type Final Discharge Note     Anticipated Discharge Disposition Psychiatric Hospital     What phone number can be called within the next 1-3 days to see how you are doing after discharge? [phone]        Post-Acute Status    Post-Acute Authorization Placement     Post-Acute Placement Status Set-up Complete/Auth obtained     Discharge Delays Ambulance Transport/Facility Transport                     Important Message from Medicare

## 2023-11-09 NOTE — SUBJECTIVE & OBJECTIVE
Interval History: NAEON. Bp improved. Off midodrine and IVF. Not requiring any ativan so far this admission. Reconsulting psych today. She is currently medically clear at this point.    Review of Systems   All other systems reviewed and are negative.    Objective:     Vital Signs (Most Recent):  Temp: 98.3 °F (36.8 °C) (11/09/23 1118)  Pulse: 72 (11/09/23 1118)  Resp: 10 (11/09/23 1118)  BP: 103/75 (11/09/23 1118)  SpO2: 96 % (11/09/23 1118) Vital Signs (24h Range):  Temp:  [96.9 °F (36.1 °C)-98.3 °F (36.8 °C)] 98.3 °F (36.8 °C)  Pulse:  [55-92] 72  Resp:  [10-22] 10  SpO2:  [91 %-99 %] 96 %  BP: ()/(44-81) 103/75     Weight: 57.1 kg (125 lb 14.1 oz)  Body mass index is 22.3 kg/m².    Intake/Output Summary (Last 24 hours) at 11/9/2023 1136  Last data filed at 11/9/2023 0458  Gross per 24 hour   Intake 5905.1 ml   Output --   Net 5905.1 ml         Physical Exam      Vitals reviewed  General: NAD, Well developed, Well Nourished  Head: NC/AT  Eyes: EOMI, ILYA  Cardiovascular: Pulses intact distally, Regular Rate and rhythm  Pulmonary: Normal Respiratory Rate, No respiratory distress  Gi: Soft, Non-tender  Extremities: Warm, No edema present  Skin: Warm, dry  Neuro: Alert, Oriented x3, No focal Deficit  Psych: Appropriate mood and affect      Significant Labs: All pertinent labs within the past 24 hours have been reviewed.    Significant Imaging: I have reviewed all pertinent imaging results/findings within the past 24 hours.

## 2023-11-09 NOTE — PLAN OF CARE
Problem: Adult Inpatient Plan of Care  Goal: Plan of Care Review  Outcome: Ongoing, Progressing     Problem: Cognitive Impairment (Depressive Signs/Symptoms)  Goal: Optimized Cognitive Function  Outcome: Ongoing, Progressing     Problem: Sleep Disturbance (Depressive Signs/Symptoms)  Goal: Improved Sleep (Depressive Signs/Symptoms)  Outcome: Ongoing, Progressing

## 2023-11-09 NOTE — ASSESSMENT & PLAN NOTE
When pressed, she tells me that she took 10-15 pills, but of an unknown dosage, around midnight.  Her QTc is currently normal on repeat ECG.  Tele-psych consulted- Appreciate recommendations  Poison control notified and recs noted in the nurses notes.  Patient currently medically clear at this point. Will likely need med psych

## 2023-11-09 NOTE — NURSING
C/o chest pain, mid sternum pain non radiating scale 1-10 = 3. Increased chest pain with inspiration. Ekg performed. Normal sinus rhythm with normal arrthymia.

## 2023-11-09 NOTE — ASSESSMENT & PLAN NOTE
She has been drinking at least a pint of vodka daily for many years  CIWA q4h with PRN ativan- has not required any doses so far  Counseled on cessation  Thiamine and folic acid daily

## 2023-11-09 NOTE — NURSING
Patient notified of transfer for IP psych admission to [facility]. Patient wanted me to call her boyfriend Sameer [phone] to pack her a bag. Called Sameer stated he was in [place] and could not pack bag. Left  for brother Brian [phone] as requested by patient.    1705- Jason called back. Will pack her a bag and bring to hospital.     1730- Brother brought belongings. Notified security. Okay for brother to visit with patient under supervision per Dr. Vazquez.     1750- Belongings searched by security. Left with sitter outside of patient room. Brother still at bedside.

## 2023-11-09 NOTE — PROGRESS NOTES
"[facility] Medicine  Progress Note    Patient Name: Sarah Montalvo  MRN: [id number]  Patient Class: OP- Observation   Admission Date: 11/8/2023  Length of Stay: 0 days  Attending Physician: Lenny Vazquez MD  Primary Care Provider: Ana, Primary Doctor        Subjective:     Principal Problem:Intentional overdose of trazodone        HPI:  Ms. Montalvo is a 27yo lady with a past medical history of alcohol abuse, fatty liver, kidney stones and PID.  She was recently seen in the ED at [place] on 10/22/23 due to intoxication and wishes to stop drinking.  She was given a banana bag and discharged on Librium taper with detox information.  She tells me that she drinks about a pint of vodka daily, including last night.  She does not smoke cigarettes, but does, "smoke weed" on occasion.    She is very sedated and difficult to get a history from, but as best I can tell she, "hates birthdays" so got drunk tonight and began to facetime with her ex-boyfriend, Sameer.  She tells me that she, "got into a pissing contest.  Got mad, and just took a handful of pills."  She states the pills she took were Trazodone, and that she swallowed them between 11pm and midnight.  She is unsure of the dosage, as she got them from her mother's cabinet.  She also took an unknown amount of Librium.      Her boyfriend immediately called 911, then later, "the  just showed up at my house, and I sure didn't call them."  She tells me she was not intending to kill herself, but rather, "it was just a dumb dare."  Shortly after taking all the pills, she did have a few episodes of N/V/    In the ED her VS were BP (!) 89/49 -> 82/44 -> 98/52   Pulse 66 -> 86   Temp 97.7 °F (36.5 °C) (Oral)   Resp 14   LMP 10/01/2023   SpO2 (!) 96 -> 91% RA.  Labs showed , Cr 0.8, AST 96, ALT 50, ETOH 197.      CXR showed normal heart size, no infiltrate (personal read).  EKG showed sinus al with sinus arrhythmia, rate 56, no ST-T " wave abnormalities, QTc 445 ms.    In the ED she was treated with:  Medications   thiamine (B-1) 100 mg/mL injection (has no administration in time range)   mvi, (ADULT) no.4 with vit K 3,300 unit- 150 mcg/10 mL injection Soln (has no administration in time range)   sodium chloride 0.9% bolus 1,000 mL 1,000 mL (0 mLs Intravenous Stopped 11/8/23 0346)   sodium chloride 0.9% 1,000 mL with mvi, (ADULT) no.4 with vit K 3,300 unit- 150 mcg/10 mL 10 mL, thiamine 100 mg, folic acid 1 mg infusion ( Intravenous New Bag 11/8/23 0445)   sodium chloride 0.9% bolus 1,000 mL 1,000 mL (0 mLs Intravenous Stopped 11/8/23 0435)             Overview/Hospital Course:  No notes on file    Interval History: NAEON. Bp improved. Off midodrine and IVF. Not requiring any ativan so far this admission. Reconsulting psych today. She is currently medically clear at this point.    Review of Systems   All other systems reviewed and are negative.    Objective:     Vital Signs (Most Recent):  Temp: 98.3 °F (36.8 °C) (11/09/23 1118)  Pulse: 72 (11/09/23 1118)  Resp: 10 (11/09/23 1118)  BP: 103/75 (11/09/23 1118)  SpO2: 96 % (11/09/23 1118) Vital Signs (24h Range):  Temp:  [96.9 °F (36.1 °C)-98.3 °F (36.8 °C)] 98.3 °F (36.8 °C)  Pulse:  [55-92] 72  Resp:  [10-22] 10  SpO2:  [91 %-99 %] 96 %  BP: ()/(44-81) 103/75     Weight: 57.1 kg (125 lb 14.1 oz)  Body mass index is 22.3 kg/m².    Intake/Output Summary (Last 24 hours) at 11/9/2023 1136  Last data filed at 11/9/2023 0458  Gross per 24 hour   Intake 5905.1 ml   Output --   Net 5905.1 ml         Physical Exam      Vitals reviewed  General: NAD, Well developed, Well Nourished  Head: NC/AT  Eyes: EOMI, ILYA  Cardiovascular: Pulses intact distally, Regular Rate and rhythm  Pulmonary: Normal Respiratory Rate, No respiratory distress  Gi: Soft, Non-tender  Extremities: Warm, No edema present  Skin: Warm, dry  Neuro: Alert, Oriented x3, No focal Deficit  Psych: Appropriate mood and  "affect      Significant Labs: All pertinent labs within the past 24 hours have been reviewed.    Significant Imaging: I have reviewed all pertinent imaging results/findings within the past 24 hours.      Assessment/Plan:      * Intentional overdose of trazodone  When pressed, she tells me that she took 10-15 pills, but of an unknown dosage, around midnight.  Her QTc is currently normal on repeat ECG.  Tele-psych consulted- Appreciate recommendations  Poison control notified and recs noted in the nurses notes.  Patient currently medically clear at this point. Will likely need med psych         Marijuana abuse  She tells me she, "smokes weed on occasion"  Counseled on cessation      Fatty liver due to alcoholism  I counseled her for 5 minutes on the pathophysiology of alcohol causing fatty liver, then cirrhosis, then miserable death with ascites and encephalopathy.  She seemed stunned by these risks, so hopefully with help she will quit.        Alcohol abuse  She has been drinking at least a pint of vodka daily for many years  CIWA q4h with PRN ativan- has not required any doses so far  Counseled on cessation  Thiamine and folic acid daily      VTE Risk Mitigation (From admission, onward)         Ordered     IP VTE LOW RISK PATIENT  Once         11/08/23 0535     Place sequential compression device  Until discontinued         11/08/23 0535     Place WILLEM hose  Until discontinued         11/08/23 0535                Discharge Planning   HEAVENLY: 11/11/2023     Code Status: Full Code   Is the patient medically ready for discharge?:     Reason for patient still in hospital (select all that apply): Treatment  Discharge Plan A: [facility]                  Lenny Vazquez MD  Department of [facility] Medicine   [place] - Intensive Care  "

## 2023-11-09 NOTE — NURSING
Tele consult called into transfer line. Notified by answering service that consult would be done sometime after noon.

## 2023-11-09 NOTE — CONSULTS
"[facility]  Psychiatry  Telepsychiatry Progress Note    Please see previous notes:    Patient agreeable to consultation via telepsychiatry.    Tele-Consultation from Psychiatry started: 11/9/2023 at 11:43 AM  The chief complaint leading to psychiatric consultation is: re-evaluation  This consultation was requested by Dr Vazquez, the attending physician.  The location of the consulting psychiatrist is [place].  The patient location is  [facility] ICU   The patient arrived at the ED at: on 11/08/2023     Also present with the patient at the time of the consultation: RN    Patient Identification:   Sarah Montalvo is a 28 y.o. female.    Patient information was obtained from patient, past medical records, and primary team.    Inpatient consult to Telemedicine - Psyc  Consult performed by: Sumaya Strickland MD  Consult ordered by: Lenny Vazquez MD        Teleconsult Time Documentation  Subjective:     History of Present Illness:  Per initial telepsychiatry consult 11/8/23: "HPI:   Sarah Montalvo is a 28 y.o. female with a past medical history as noted above/below and a past psychiatric history of depression, anxiety, alcohol use disorder, and cannabis abuse, currently in the ED as noted above for an intentional overdose on Trazodone and Librium.  Psychiatry was originally consulted as noted above.  The patient was seen and examined.  The chart was reviewed.  On examination today, the patient was alert and oriented to person, place, city, state, month, year, and situation.  She was CAM-ICU negative for delirium.  She endorses that she was out last night intoxicated for her birthday, got into an argument with her boyfriend, and took a hand full of Trazodone and Librium while on FaceTime with her boyfriend, Sameer Quinn, "to prove a point."  She has made inconsistent and contradictory statements regarding the intent behind her overdose / ingestion, at one point stating that her boyfriend dared her to do it " "then later saying that he didn't tell her to do that.  She does endorses worsening depression over the past few months that has been complicated by near daily intake of at least a pint of vodka daily since January 2023 (multi-year hx of heavy alcohol abuse).  She denies any hx of complicated alcohol withdrawal.  She endorses current / recent feelings of hopelessness, worthlessness, guilt, with concerns for passive SI (see detailed psych ROS below for current / recent symptoms of depression, anxiety, insomnia, and substance abuse / dependence).  She endorses intermittent issues with insomnia and reduced appetite (unable to quantify despite multiple attempts).  She endorses intermittent cannabis abuse.  She denies any current plan to harm herself.  She denies any current or prior passive/active HI.  She appeared objectively depressed on exam with PMR, blunted affect, monotonous speech, poor eye contact, etc.  She endorses a desire for sobriety but then stated that her ability to achieve sobriety is notably negatively effected by her depression.  She denies any current or recent psychiatric medications or  care.  Regarding current medical/physical complaints, she endorses fatigue, nasal congestion, and dry mouth.  She denies any other medical complaints at this time.  NAD was observed during the examination.  Of note, the patient's boyfriend, Sameer Quinn, told EMS that the patient clearly stated she was overdosing on the pills in a suicide attempt.  I was unable to reach Sameer as noted below.  Psychotherapy was implemented as noted below with a focus on improving depression with SI, anxiety, sleep, and polysubstance cessation / total sobriety.  See detailed psych ROS below."      Per medicine MD progress note 11/09/2023: "Interval History: NAEON. Bp improved. Off midodrine and IVF. Not requiring any ativan so far this admission. Reconsulting psych today. She is currently medically clear at this point."    Pt seen and " "chart reviewed. On exam, pt sleeping but awakens easily. Calm and cooperative. Requesting outpatient tx rather than inpatient; "I'll take it seriously...I been wanting to fix this problem for a while." Asked about presenting to ED 10/22/23 for outpatient help w detox, given librium, says "it wasn't like an actual plan, they just gave me medicine to get through withdrawals." Asked why she continued to drink says "I don't have a good answer; to cope with whatever it is." Asked what "it" is says "I'm anxious a lot, I don't know, [alcohol] made me feel more normal." Open to SSRI, took Prozac approx 2 mo at age 14, tolerated but says "it wouldn't help...I was in a toxic household, my sister got custody." Amenable to resuming now. Says she is worried about being away from her son and effect on her job if hospitalized for a long time. Discussed importance of supervised alcohol WD and benefits of inpatient tx, pt expressed understanding. Denied SI/HI/AVH. No other questions or concerns at this time.      Modified SAD PERSONS Scale    Suicide Risk Assessment (if applicable)-  A: Access to lethal means ? N  Risk Factors:  S: Male sex ? 1   A: Age 15-25 or 59+ years ? 1   D: Depression or hopelessness ? 2   P: Previous suicidal attempts or psychiatric care ? 1   E: Excessive ethanol or drug use ? 1   R: Rational thinking loss (psychotic or organic illness) ? 2   S: Single,  or  ? 1   O: Organized or serious attempt ? 2   N: No social support ? 1   S: Stated future intent (determined to repeat or ambivalent) ? 2  Protective Factors:  C: Contracts for safety ? 1  H: Hopeful for the future ? 1  O: Oriented to the future ? 1   I:  Intent- denies ? 1  R: Reasons not to attempt (namely, impact on loved ones and [place]ism) ? 1      This score is then mapped onto a risk assessment scale as follows:  0-5: May be safe to discharge (depending upon circumstances)  6-8: Probably requires psychiatric consultation  >8: Probably " "requires hospital admission    TOTAL-7      Psychiatric Mental Status Exam:  Arousal: alert  Sensorium/Orientation: oriented to person, place, situation, day of week, month of year, year  Behavior/Cooperation: cooperative, eye contact normal   Speech: normal tone, normal rate, normal pitch, soft  Language: grossly intact  Mood: " ok "   Affect: depressed  Thought Process: logical  Thought Content:   Auditory hallucinations: NO  Visual hallucinations: NO  Paranoia: NO  Delusions:  NO  Suicidal ideation: NO  Homicidal ideation: NO  Attention/Concentration:  spelled "HOUSE" forwards and backwards  Memory:    Recent:  Decreased   Remote: Intact   3/3 immediate, 3/3 at 5 min  Insight: fair  Judgment: behavior is adequate to circumstances           Past Medical History:   Past Medical History:   Diagnosis Date    EtOH dependence     Fatty liver due to alcoholism     Kidney stone on left side     PID (pelvic inflammatory disease)       Laboratory Data:   Labs Reviewed   CBC W/ AUTO DIFFERENTIAL - Abnormal; Notable for the following components:       Result Value    RBC 3.81 (*)      (*)     MCH 34.6 (*)     Immature Granulocytes 0.7 (*)     Basophil % 2.1 (*)     All other components within normal limits   COMPREHENSIVE METABOLIC PANEL - Abnormal; Notable for the following components:    CO2 22 (*)     AST 96 (*)     ALT 50 (*)     All other components within normal limits   URINALYSIS, REFLEX TO URINE CULTURE - Abnormal; Notable for the following components:    Appearance, UA Hazy (*)     Protein, UA Trace (*)     Occult Blood UA Trace (*)     All other components within normal limits    Narrative:     Preferred Collection Type->Urine, Clean Catch  Specimen Source->Urine   DRUG SCREEN PANEL, URINE EMERGENCY - Abnormal; Notable for the following components:    Benzodiazepines Presumptive Positive (*)     THC Presumptive Positive (*)     All other components within normal limits    Narrative:     Preferred Collection " Type->Urine, Clean Catch  Specimen Source->Urine   ALCOHOL,MEDICAL (ETHANOL) - Abnormal; Notable for the following components:    Alcohol, Serum 197 (*)     All other components within normal limits   ACETAMINOPHEN LEVEL - Abnormal; Notable for the following components:    Acetaminophen (Tylenol), Serum <3.0 (*)     All other components within normal limits   SALICYLATE LEVEL - Abnormal; Notable for the following components:    Salicylate Lvl <5.0 (*)     All other components within normal limits   TSH   SARS-COV-2 RNA AMPLIFICATION, QUAL    Narrative:     Is the patient symptomatic?->No   PREGNANCY TEST, URINE RAPID    Narrative:     Specimen Source->Urine   MAGNESIUM   PHOSPHORUS   MAGNESIUM   PHOSPHORUS         Allergies:   Review of patient's allergies indicates:  No Known Allergies    Medications in ER:   Medications   thiamine (B-1) 100 mg/mL injection (  Back Association 11/8/23 0400)   mvi, (ADULT) no.4 with vit K 3,300 unit- 150 mcg/10 mL injection Soln (  Back Association 11/8/23 0445)   multivitamin tablet (1 tablet Oral Given 11/9/23 0911)   thiamine tablet 100 mg (100 mg Oral Given 11/9/23 0911)   folic acid tablet 1 mg (1 mg Oral Given 11/9/23 0911)   sodium chloride 0.9% flush 10 mL (has no administration in time range)   naloxone 0.4 mg/mL injection 0.02 mg (has no administration in time range)   glucose chewable tablet 16 g (has no administration in time range)   glucose chewable tablet 24 g (has no administration in time range)   glucagon (human recombinant) injection 1 mg (has no administration in time range)   acetaminophen tablet 500 mg (500 mg Oral Given 11/8/23 2329)   polyethylene glycol packet 17 g (17 g Oral Not Given 11/9/23 0900)   senna-docusate 8.6-50 mg per tablet 1 tablet (has no administration in time range)   aluminum-magnesium hydroxide-simethicone 200-200-20 mg/5 mL suspension 30 mL (30 mLs Oral Given 11/9/23 0005)   simethicone chewable tablet 80 mg (has no administration in time  range)   dextrose 10% bolus 125 mL 125 mL (has no administration in time range)   dextrose 10% bolus 250 mL 250 mL (has no administration in time range)   pantoprazole EC tablet 40 mg (40 mg Oral Given 11/9/23 0911)   LORazepam tablet 1 mg (has no administration in time range)   sodium chloride 0.9% bolus 1,000 mL 1,000 mL (0 mLs Intravenous Stopped 11/8/23 0346)   sodium chloride 0.9% 1,000 mL with mvi, (ADULT) no.4 with vit K 3,300 unit- 150 mcg/10 mL 10 mL, thiamine 100 mg, folic acid 1 mg infusion ( Intravenous Stopped 11/8/23 0910)   sodium chloride 0.9% bolus 1,000 mL 1,000 mL (0 mLs Intravenous Stopped 11/8/23 0435)   magnesium sulfate in dextrose IVPB (premix) 1 g (0 g Intravenous Stopped 11/8/23 0725)   sodium chloride 0.9% bolus 1,000 mL 1,000 mL (0 mLs Intravenous Stopped 11/8/23 0705)   lactated ringers bolus 500 mL (0 mLs Intravenous Stopped 11/8/23 0949)   lactated ringers bolus 1,000 mL (0 mLs Intravenous Stopped 11/8/23 1215)   lactated ringers bolus 1,000 mL (0 mLs Intravenous Stopped 11/8/23 1747)   potassium, sodium phosphates 280-160-250 mg packet 2 packet (2 packets Oral Given 11/9/23 0911)         Assessment - Diagnosis - Goals:     IMPRESSION:   Suicide Attempt via Intentional Drug Overdose (Trazodone and Librium), Initial Encounter   Unspecified Depressive Disorder with Suicidal Ideation  Alcohol Use Disorder, Severe, Dependence   Unspecified Anxiety Disorder     RECOMMENDATIONS:     DISPOSITION: Once medically cleared;   Seek Involuntary Inpatient Psychiatric admission for stabilization of acute psychiatric symptoms and until a safe disposition plan is enacted. The pt was informed that the pt will be transferred to an Inpt unit per ED placement team.     PSYCHIATRIC MEDICATIONS  Scheduled-resume Prozac 20 mg daily; Thiamine 100 mg daily, Folate 1 mg daily, & MVI daily   PRN-Alcohol Withdrawal Protocol:   - Frequent assessment with the CIWA-Ar is the standard of care and the hallmark of  clinical practice  - Clinical [place] Withdrawal Assessment of Alcohol Scale (CIWA-Ar)  If CIWA is <8 and vital signs are stable, no PRN medication is required. Repeat vital signs q4 and the CIWA q8.  If CIWA is between 8 and 15, give Lorazepam 2 mg PO/IM q4 PRN and complete vital signs q2 and the CIWA q4.  If CIWA is ?15 or DBP ?110 mmHg, give Lorazepam 2 mg PO/IM q1 until patient has a CIWA of <15 or DBP <110 mmHg. CIWA and vital signs checked q1 until patients CIWA is <15 and DBP <110 mmHg  Call MD for for SBP >180, DBP >120, or HR >110 or if patient requires ?6 mg of lorazepam in 3 hours.  - Vitals q4hr; Ativan 2mg PO/IM q4hr PRN, for CIWA >8 or if 2 criteria are met: Systolic BP>160, Diastolic BP >100 or HR >110    LEGAL  Continue involuntary hold because pt is in imminent danger of hurting self. Please provide with 1:1 sitter.     OTHER  Obtain collateral      Total time including chart review, time with patient, obtaining collateral info[if necessary/possible]: 40        More than 50% of the time was spent counseling/coordinating care    Consulting clinician was informed of the encounter and consult note.    Consultation ended: 11/9/2023 at 12:33 PM      Sumaya Strickland MD   Psychiatry  [facility]

## 2023-11-10 VITALS
TEMPERATURE: 98 F | WEIGHT: 125.88 LBS | SYSTOLIC BLOOD PRESSURE: 114 MMHG | OXYGEN SATURATION: 99 % | DIASTOLIC BLOOD PRESSURE: 68 MMHG | HEART RATE: 62 BPM | HEIGHT: 63 IN | BODY MASS INDEX: 22.3 KG/M2 | RESPIRATION RATE: 14 BRPM

## 2023-11-10 LAB
ALBUMIN SERPL BCP-MCNC: 3.4 G/DL (ref 3.5–5.2)
ALP SERPL-CCNC: 78 U/L (ref 55–135)
ALT SERPL W/O P-5'-P-CCNC: 30 U/L (ref 10–44)
ANION GAP SERPL CALC-SCNC: 8 MMOL/L (ref 8–16)
AST SERPL-CCNC: 37 U/L (ref 10–40)
BASOPHILS # BLD AUTO: 0.08 K/UL (ref 0–0.2)
BASOPHILS NFR BLD: 1.3 % (ref 0–1.9)
BILIRUB SERPL-MCNC: 0.5 MG/DL (ref 0.1–1)
BUN SERPL-MCNC: 3 MG/DL (ref 6–20)
CALCIUM SERPL-MCNC: 8.6 MG/DL (ref 8.7–10.5)
CHLORIDE SERPL-SCNC: 105 MMOL/L (ref 95–110)
CO2 SERPL-SCNC: 24 MMOL/L (ref 23–29)
CREAT SERPL-MCNC: 0.7 MG/DL (ref 0.5–1.4)
DIFFERENTIAL METHOD: ABNORMAL
EOSINOPHIL # BLD AUTO: 0.1 K/UL (ref 0–0.5)
EOSINOPHIL NFR BLD: 1.9 % (ref 0–8)
ERYTHROCYTE [DISTWIDTH] IN BLOOD BY AUTOMATED COUNT: 11.3 % (ref 11.5–14.5)
EST. GFR  (NO RACE VARIABLE): >60 ML/MIN/1.73 M^2
GLUCOSE SERPL-MCNC: 83 MG/DL (ref 70–110)
HCT VFR BLD AUTO: 34.8 % (ref 37–48.5)
HGB BLD-MCNC: 12 G/DL (ref 12–16)
IMM GRANULOCYTES # BLD AUTO: 0.03 K/UL (ref 0–0.04)
IMM GRANULOCYTES NFR BLD AUTO: 0.5 % (ref 0–0.5)
LYMPHOCYTES # BLD AUTO: 1.8 K/UL (ref 1–4.8)
LYMPHOCYTES NFR BLD: 28.2 % (ref 18–48)
MAGNESIUM SERPL-MCNC: 1.8 MG/DL (ref 1.6–2.6)
MCH RBC QN AUTO: 34.8 PG (ref 27–31)
MCHC RBC AUTO-ENTMCNC: 34.5 G/DL (ref 32–36)
MCV RBC AUTO: 101 FL (ref 82–98)
MONOCYTES # BLD AUTO: 0.7 K/UL (ref 0.3–1)
MONOCYTES NFR BLD: 10.9 % (ref 4–15)
NEUTROPHILS # BLD AUTO: 3.6 K/UL (ref 1.8–7.7)
NEUTROPHILS NFR BLD: 57.2 % (ref 38–73)
NRBC BLD-RTO: 0 /100 WBC
PHOSPHATE SERPL-MCNC: 3 MG/DL (ref 2.7–4.5)
PLATELET # BLD AUTO: 233 K/UL (ref 150–450)
PMV BLD AUTO: 11 FL (ref 9.2–12.9)
POTASSIUM SERPL-SCNC: 3.7 MMOL/L (ref 3.5–5.1)
PROT SERPL-MCNC: 6 G/DL (ref 6–8.4)
RBC # BLD AUTO: 3.45 M/UL (ref 4–5.4)
SODIUM SERPL-SCNC: 137 MMOL/L (ref 136–145)
WBC # BLD AUTO: 6.25 K/UL (ref 3.9–12.7)

## 2023-11-10 PROCEDURE — 99238 HOSP IP/OBS DSCHRG MGMT 30/<: CPT | Mod: ,,, | Performed by: STUDENT IN AN ORGANIZED HEALTH CARE EDUCATION/TRAINING PROGRAM

## 2023-11-10 PROCEDURE — 80053 COMPREHEN METABOLIC PANEL: CPT | Performed by: INTERNAL MEDICINE

## 2023-11-10 PROCEDURE — 85025 COMPLETE CBC W/AUTO DIFF WBC: CPT | Performed by: INTERNAL MEDICINE

## 2023-11-10 PROCEDURE — 99238 PR HOSPITAL DISCHARGE DAY,<30 MIN: ICD-10-PCS | Mod: ,,, | Performed by: STUDENT IN AN ORGANIZED HEALTH CARE EDUCATION/TRAINING PROGRAM

## 2023-11-10 PROCEDURE — 83735 ASSAY OF MAGNESIUM: CPT | Performed by: INTERNAL MEDICINE

## 2023-11-10 PROCEDURE — G0378 HOSPITAL OBSERVATION PER HR: HCPCS

## 2023-11-10 PROCEDURE — 84100 ASSAY OF PHOSPHORUS: CPT | Performed by: INTERNAL MEDICINE

## 2023-11-10 NOTE — NURSING
[facility] here to get patient. Patient assisted into own clothes. PIV removed; bandage applied. Patient ambulated off unit with [facility].

## 2023-11-15 NOTE — HOSPITAL COURSE
Patient admitted following trazodone and possible librium overdose. Monitored closely until BP normalized and patient's mentation back to baseline. Patient showed no signs of EtOH withdrawal during hospital stay requiring no doses of PRN ativan. Psychiatry consulted multiple times who felt patient would benefit from inpatient psych once medically stable. Patient medically clear and accepted to facility.

## 2023-11-15 NOTE — DISCHARGE SUMMARY
"[facility] Medicine  Discharge Summary      Patient Name: Sarah Montalvo  MRN: [id number]  SACHA: [id number]  Patient Class: OP- Observation  Admission Date: 11/8/2023  Hospital Length of Stay: 0 days  Discharge Date and Time: 11/10/2023  8:30 AM  Attending Physician: Ana att. providers found   Discharging Provider: Lenny Vazquez MD  Primary Care Provider: Ana, Primary Doctor    Primary Care Team: Networked reference to record PCT     HPI:   Ms. Montalvo is a 27yo lady with a past medical history of alcohol abuse, fatty liver, kidney stones and PID.  She was recently seen in the ED at [place] on 10/22/23 due to intoxication and wishes to stop drinking.  She was given a banana bag and discharged on Librium taper with detox information.  She tells me that she drinks about a pint of vodka daily, including last night.  She does not smoke cigarettes, but does, "smoke weed" on occasion.    She is very sedated and difficult to get a history from, but as best I can tell she, "hates birthdays" so got drunk tonight and began to facetime with her ex-boyfriend, Sameer.  She tells me that she, "got into a pissing contest.  Got mad, and just took a handful of pills."  She states the pills she took were Trazodone, and that she swallowed them between 11pm and midnight.  She is unsure of the dosage, as she got them from her mother's cabinet.  She also took an unknown amount of Librium.      Her boyfriend immediately called 911, then later, "the  just showed up at my house, and I sure didn't call them."  She tells me she was not intending to kill herself, but rather, "it was just a dumb dare."  Shortly after taking all the pills, she did have a few episodes of N/V/    In the ED her VS were BP (!) 89/49 -> 82/44 -> 98/52   Pulse 66 -> 86   Temp 97.7 °F (36.5 °C) (Oral)   Resp 14   LMP 10/01/2023   SpO2 (!) 96 -> 91% RA.  Labs showed , Cr 0.8, AST 96, ALT 50, ETOH 197.      CXR showed normal " heart size, no infiltrate (personal read).  EKG showed sinus al with sinus arrhythmia, rate 56, no ST-T wave abnormalities, QTc 445 ms.    In the ED she was treated with:  Medications   thiamine (B-1) 100 mg/mL injection (has no administration in time range)   mvi, (ADULT) no.4 with vit K 3,300 unit- 150 mcg/10 mL injection Soln (has no administration in time range)   sodium chloride 0.9% bolus 1,000 mL 1,000 mL (0 mLs Intravenous Stopped 11/8/23 0346)   sodium chloride 0.9% 1,000 mL with mvi, (ADULT) no.4 with vit K 3,300 unit- 150 mcg/10 mL 10 mL, thiamine 100 mg, folic acid 1 mg infusion ( Intravenous New Bag 11/8/23 0445)   sodium chloride 0.9% bolus 1,000 mL 1,000 mL (0 mLs Intravenous Stopped 11/8/23 0435)           * No surgery found *      Hospital Course:   Patient admitted following trazodone and possible librium overdose. Monitored closely until BP normalized and patient's mentation back to baseline. Patient showed no signs of EtOH withdrawal during hospital stay requiring no doses of PRN ativan. Psychiatry consulted multiple times who felt patient would benefit from inpatient psych once medically stable. Patient medically clear and accepted to facility.     Goals of Care Treatment Preferences:  Code Status: Full Code      Consults:   Consults (From admission, onward)          Status Ordering Provider     Inpatient consult to Telemedicine - Psyc  Once        Provider:  Sumaya Strickland MD    Completed THAI PITTS     Inpatient consult to Telemedicine - Psych  Once        Provider:  Atul Pinto MD    Completed JIM BLACKBURN            No new Assessment & Plan notes have been filed under this hospital service since the last note was generated.  Service: Hospital Medicine    Final Active Diagnoses:    Diagnosis Date Noted POA    PRINCIPAL PROBLEM:  Intentional overdose of trazodone [T43.212A] 11/08/2023 Yes    Alcohol abuse [F10.10] 11/08/2023 Yes    Fatty liver due to alcoholism  [K70.0] 11/08/2023 Yes    Marijuana abuse [F12.10] 11/08/2023 Yes      Problems Resolved During this Admission:       Discharged Condition: good    Disposition: Psychiatric Hospital    Follow Up:    Patient Instructions:   No discharge procedures on file.    Significant Diagnostic Studies: N/A    Pending Diagnostic Studies:       Procedure Component Value Units Date/Time    EKG 12-lead [[id number]] Collected: 11/08/23 2323    Order Status: Sent Lab Status: In process Updated: 11/09/23 0734    Narrative:      Test Reason : R07.9,    Vent. Rate : 065 BPM     Atrial Rate : 065 BPM     P-R Int : 186 ms          QRS Dur : 084 ms      QT Int : 410 ms       P-R-T Axes : 068 063 050 degrees     QTc Int : 426 ms    Sinus rhythm with marked sinus arrythmia  Low voltage QRS  Borderline Abnormal ECG  When compared with ECG of 08-NOV-2023 09:42,  No significant change was found    Referred By: [email or web address]   SELF           Confirmed By:            Medications:  Reconciled Home Medications:      Medication List        ASK your doctor about these medications      chlordiazepoxide 25 MG Cap  Commonly known as: LIBRIUM  Take 1 capsule (25 mg total) by mouth every 8 (eight) hours as needed.     tamsulosin 0.4 mg Cap  Commonly known as: FLOMAX  Take 1 capsule (0.4 mg total) by mouth once daily. for 6 doses              Indwelling Lines/Drains at time of discharge:   Lines/Drains/Airways       None                   Time spent on the discharge of patient: 15 minutes         Lenny Vazquez MD  Department of Hospital Medicine  [facility]ensive [facility]